# Patient Record
Sex: MALE | Race: WHITE | NOT HISPANIC OR LATINO | Employment: UNEMPLOYED | URBAN - METROPOLITAN AREA
[De-identification: names, ages, dates, MRNs, and addresses within clinical notes are randomized per-mention and may not be internally consistent; named-entity substitution may affect disease eponyms.]

---

## 2019-03-18 ENCOUNTER — OFFICE VISIT (OUTPATIENT)
Dept: FAMILY MEDICINE CLINIC | Facility: CLINIC | Age: 49
End: 2019-03-18
Payer: COMMERCIAL

## 2019-03-18 VITALS
RESPIRATION RATE: 16 BRPM | TEMPERATURE: 99.4 F | DIASTOLIC BLOOD PRESSURE: 90 MMHG | HEART RATE: 64 BPM | WEIGHT: 156 LBS | OXYGEN SATURATION: 98 % | SYSTOLIC BLOOD PRESSURE: 138 MMHG

## 2019-03-18 DIAGNOSIS — M25.512 LEFT ANTERIOR SHOULDER PAIN: Primary | ICD-10-CM

## 2019-03-18 PROCEDURE — 99202 OFFICE O/P NEW SF 15 MIN: CPT | Performed by: NURSE PRACTITIONER

## 2019-03-18 RX ORDER — PREDNISONE 20 MG/1
20 TABLET ORAL 2 TIMES DAILY WITH MEALS
Qty: 10 TABLET | Refills: 0 | Status: SHIPPED | OUTPATIENT
Start: 2019-03-18 | End: 2019-03-23

## 2019-03-18 RX ORDER — NAPROXEN 500 MG/1
500 TABLET ORAL 2 TIMES DAILY WITH MEALS
Qty: 60 TABLET | Refills: 0 | Status: SHIPPED | OUTPATIENT
Start: 2019-03-18

## 2019-03-18 NOTE — PROGRESS NOTES
[address] [facility]        NAME: Erich Merchant is a 51 y o  male  : 1970    MRN: [id number]  DATE: 2019  TIME: 6:38 PM    Assessment and Plan   Left anterior shoulder pain [M25 512]  1  Left anterior shoulder pain  XR shoulder 2+ vw left    predniSONE 20 mg tablet    naproxen (NAPROSYN) 500 mg tablet         Patient Instructions     Patient Instructions   Get xray of left shoulder as ordered  Take Prednisone and Naproxen as directed  Consult ortho if pain continues or if xray is abnormal  Call or return for problems/concerns          Chief Complaint     Chief Complaint   Patient presents with    Shoulder Pain     C/O left shoulder pain x's 1 month  C/O decreased range of motion  Patient states he heard a "pop" in the area about a month ago  Using OTC NSAIDS and rubs without relief  History of Present Illness       Left shoulder pain x 1 month- diagnosed with a "joint disease" when I was a kid- told he needs a left hip replacement  No known recent injury  Review of Systems   Review of Systems   Constitutional: Negative for activity change, diaphoresis, fatigue and fever  HENT: Negative for congestion, facial swelling, hearing loss, rhinorrhea, sinus pressure, sinus pain, sneezing, sore throat and voice change  Eyes: Negative for discharge and visual disturbance  Respiratory: Negative for cough, choking, chest tightness, shortness of breath, wheezing and stridor  Cardiovascular: Negative for chest pain, palpitations and leg swelling  Gastrointestinal: Negative for abdominal distention, abdominal pain, constipation, diarrhea, nausea and vomiting  Endocrine: Negative for polydipsia, polyphagia and polyuria  Genitourinary: Negative for difficulty urinating, dysuria, frequency and urgency  Musculoskeletal: Positive for arthralgias  Negative for back pain, gait problem, joint swelling, myalgias, neck pain and neck stiffness     Skin: Negative for color change, rash and wound  Neurological: Negative for dizziness, syncope, speech difficulty, weakness, light-headedness and headaches  Hematological: Negative for adenopathy  Does not bruise/bleed easily  Psychiatric/Behavioral: Negative for agitation, behavioral problems, confusion, hallucinations, sleep disturbance and suicidal ideas  The patient is not nervous/anxious  Current Medications       Current Outpatient Medications:     naproxen (NAPROSYN) 500 mg tablet, Take 1 tablet (500 mg total) by mouth 2 (two) times a day with meals, Disp: 60 tablet, Rfl: 0    predniSONE 20 mg tablet, Take 1 tablet (20 mg total) by mouth 2 (two) times a day with meals for 5 days, Disp: 10 tablet, Rfl: 0    Current Allergies     Allergies as of 03/18/2019 - Reviewed 03/18/2019   Allergen Reaction Noted    Asa [aspirin] Shortness Of Breath 03/18/2019    Penicillins  03/18/2019    Tegretol [carbamazepine] Dizziness and Confusion 03/18/2019    Tramadol Vomiting 03/18/2019            The following portions of the patient's history were reviewed and updated as appropriate: allergies, current medications, past family history, past medical history, past social history, past surgical history and problem list      Past Medical History:   Diagnosis Date    Hypercholesteremia     Myocardial infarction [facility])        Past Surgical History:   Procedure Laterality Date    MANDIBLE SURGERY         Family History   Problem Relation Age of Onset    Lung cancer Mother     Heart attack Father          Medications have been verified  Objective   /90   Pulse 64   Temp 99 4 °F (37 4 °C) (Tympanic)   Resp 16   Wt 70 8 kg (156 lb)   SpO2 98%        Physical Exam     Physical Exam   Constitutional: He is oriented to person, place, and time  He appears well-developed and well-nourished  No distress  Cardiovascular: Normal rate, regular rhythm and normal heart sounds  No murmur heard    Pulmonary/Chest: Effort normal and breath sounds normal  No respiratory distress  He has no wheezes  Musculoskeletal:        Left shoulder: He exhibits decreased range of motion (unable to lift LUE above head- can lift to 90 degrees), tenderness and pain  He exhibits no bony tenderness, no swelling, no crepitus, no deformity and no laceration  Neurological: He is alert and oriented to person, place, and time  Skin: Skin is warm and dry  He is not diaphoretic  Psychiatric: He has a normal mood and affect  His behavior is normal  Judgment and thought content normal    Nursing note and vitals reviewed

## 2019-03-18 NOTE — PATIENT INSTRUCTIONS
Get xray of left shoulder as ordered  Take Prednisone and Naproxen as directed  Consult ortho if pain continues or if xray is abnormal  Call or return for problems/concerns

## 2019-03-19 ENCOUNTER — APPOINTMENT (OUTPATIENT)
Dept: RADIOLOGY | Facility: CLINIC | Age: 49
End: 2019-03-19
Payer: COMMERCIAL

## 2019-03-19 DIAGNOSIS — M25.512 LEFT ANTERIOR SHOULDER PAIN: ICD-10-CM

## 2019-03-19 PROCEDURE — 73030 X-RAY EXAM OF SHOULDER: CPT

## 2019-04-15 ENCOUNTER — OFFICE VISIT (OUTPATIENT)
Dept: URGENT CARE | Facility: CLINIC | Age: 49
End: 2019-04-15
Payer: COMMERCIAL

## 2019-04-15 VITALS
RESPIRATION RATE: 18 BRPM | TEMPERATURE: 98.1 F | HEART RATE: 63 BPM | DIASTOLIC BLOOD PRESSURE: 75 MMHG | OXYGEN SATURATION: 97 % | SYSTOLIC BLOOD PRESSURE: 125 MMHG

## 2019-04-15 DIAGNOSIS — H65.113 ACUTE MUCOID OTITIS MEDIA OF BOTH EARS: Primary | ICD-10-CM

## 2019-04-15 PROCEDURE — 99283 EMERGENCY DEPT VISIT LOW MDM: CPT | Performed by: NURSE PRACTITIONER

## 2019-04-15 PROCEDURE — G0382 LEV 3 HOSP TYPE B ED VISIT: HCPCS | Performed by: NURSE PRACTITIONER

## 2019-04-15 PROCEDURE — 99203 OFFICE O/P NEW LOW 30 MIN: CPT | Performed by: NURSE PRACTITIONER

## 2019-04-15 RX ORDER — AZITHROMYCIN 250 MG/1
TABLET, FILM COATED ORAL
Qty: 6 TABLET | Refills: 0 | Status: SHIPPED | OUTPATIENT
Start: 2019-04-15 | End: 2019-04-19

## 2019-09-22 ENCOUNTER — APPOINTMENT (EMERGENCY)
Dept: CT IMAGING | Facility: HOSPITAL | Age: 49
End: 2019-09-22
Payer: COMMERCIAL

## 2019-09-22 ENCOUNTER — HOSPITAL ENCOUNTER (EMERGENCY)
Facility: HOSPITAL | Age: 49
Discharge: HOME | End: 2019-09-22
Admitting: EMERGENCY MEDICINE
Payer: COMMERCIAL

## 2019-09-22 VITALS
DIASTOLIC BLOOD PRESSURE: 72 MMHG | WEIGHT: 158 LBS | RESPIRATION RATE: 18 BRPM | TEMPERATURE: 97.7 F | OXYGEN SATURATION: 96 % | BODY MASS INDEX: 23.4 KG/M2 | HEIGHT: 69 IN | SYSTOLIC BLOOD PRESSURE: 133 MMHG | HEART RATE: 84 BPM

## 2019-09-22 DIAGNOSIS — N30.90 CYSTITIS: ICD-10-CM

## 2019-09-22 DIAGNOSIS — R19.7 DIARRHEA: ICD-10-CM

## 2019-09-22 DIAGNOSIS — R10.84 GENERALIZED ABDOMINAL PAIN: ICD-10-CM

## 2019-09-22 DIAGNOSIS — K52.9 COLITIS: Primary | ICD-10-CM

## 2019-09-22 DIAGNOSIS — K52.9 ENTERITIS: ICD-10-CM

## 2019-09-22 DIAGNOSIS — R11.10 VOMITING: ICD-10-CM

## 2019-09-22 LAB
ALBUMIN SERPL BCP-MCNC: 5.1 G/DL (ref 3.5–5.7)
ALP SERPL-CCNC: 53 U/L (ref 40–150)
ALT SERPL W P-5'-P-CCNC: 10 U/L (ref 7–52)
ANION GAP SERPL CALCULATED.3IONS-SCNC: 13 MMOL/L (ref 4–13)
APTT PPP: 32 SECONDS (ref 23–37)
AST SERPL W P-5'-P-CCNC: 15 U/L (ref 13–39)
BACTERIA UR QL AUTO: ABNORMAL /HPF
BASOPHILS # BLD AUTO: 0.1 THOUSANDS/ΜL (ref 0–0.1)
BASOPHILS NFR BLD AUTO: 1 % (ref 0–2)
BILIRUB SERPL-MCNC: 0.6 MG/DL (ref 0.2–1)
BILIRUB UR QL STRIP: NEGATIVE
BUN SERPL-MCNC: 20 MG/DL (ref 7–25)
CALCIUM SERPL-MCNC: 10.3 MG/DL (ref 8.6–10.5)
CHLORIDE SERPL-SCNC: 101 MMOL/L (ref 98–107)
CLARITY UR: CLEAR
CO2 SERPL-SCNC: 23 MMOL/L (ref 21–31)
COLOR UR: YELLOW
CREAT SERPL-MCNC: 1.16 MG/DL (ref 0.7–1.3)
EOSINOPHIL # BLD AUTO: 0 THOUSAND/ΜL (ref 0–0.61)
EOSINOPHIL NFR BLD AUTO: 0 % (ref 0–5)
ERYTHROCYTE [DISTWIDTH] IN BLOOD BY AUTOMATED COUNT: 13.1 % (ref 11.5–14.5)
GFR SERPL CREATININE-BSD FRML MDRD: 74 ML/MIN/1.73SQ M
GLUCOSE SERPL-MCNC: 181 MG/DL (ref 65–99)
GLUCOSE UR STRIP-MCNC: NEGATIVE MG/DL
HCT VFR BLD AUTO: 44.5 % (ref 42–47)
HGB BLD-MCNC: 15.1 G/DL (ref 14–18)
HGB UR QL STRIP.AUTO: NEGATIVE
INR PPP: 1.07 (ref 0.9–1.5)
KETONES UR STRIP-MCNC: ABNORMAL MG/DL
LEUKOCYTE ESTERASE UR QL STRIP: NEGATIVE
LIPASE SERPL-CCNC: <10 U/L (ref 11–82)
LYMPHOCYTES # BLD AUTO: 1.4 THOUSANDS/ΜL (ref 0.6–4.47)
LYMPHOCYTES NFR BLD AUTO: 17 % (ref 21–51)
MCH RBC QN AUTO: 31 PG (ref 26–34)
MCHC RBC AUTO-ENTMCNC: 33.8 G/DL (ref 31–37)
MCV RBC AUTO: 92 FL (ref 81–99)
MONOCYTES # BLD AUTO: 0.3 THOUSAND/ΜL (ref 0.17–1.22)
MONOCYTES NFR BLD AUTO: 4 % (ref 2–12)
NEUTROPHILS # BLD AUTO: 6.5 THOUSANDS/ΜL (ref 1.4–6.5)
NEUTS SEG NFR BLD AUTO: 79 % (ref 42–75)
NITRITE UR QL STRIP: NEGATIVE
NON-SQ EPI CELLS URNS QL MICRO: ABNORMAL /HPF
PH UR STRIP.AUTO: >=9 [PH]
PLATELET # BLD AUTO: 222 THOUSANDS/UL (ref 149–390)
PMV BLD AUTO: 7.4 FL (ref 8.6–11.7)
POTASSIUM SERPL-SCNC: 3.5 MMOL/L (ref 3.5–5.5)
PROT SERPL-MCNC: 8.3 G/DL (ref 6.4–8.9)
PROT UR STRIP-MCNC: ABNORMAL MG/DL
PROTHROMBIN TIME: 12.4 SECONDS (ref 10.2–13)
RBC # BLD AUTO: 4.86 MILLION/UL (ref 4.3–5.9)
RBC #/AREA URNS AUTO: ABNORMAL /HPF
SODIUM SERPL-SCNC: 137 MMOL/L (ref 134–143)
SP GR UR STRIP.AUTO: 1.01 (ref 1–1.03)
UROBILINOGEN UR QL STRIP.AUTO: 0.2 E.U./DL
WBC # BLD AUTO: 8.3 THOUSAND/UL (ref 4.8–10.8)
WBC #/AREA URNS AUTO: ABNORMAL /HPF

## 2019-09-22 PROCEDURE — 36415 COLL VENOUS BLD VENIPUNCTURE: CPT

## 2019-09-22 PROCEDURE — 85610 PROTHROMBIN TIME: CPT

## 2019-09-22 PROCEDURE — 81001 URINALYSIS AUTO W/SCOPE: CPT

## 2019-09-22 PROCEDURE — 85025 COMPLETE CBC W/AUTO DIFF WBC: CPT

## 2019-09-22 PROCEDURE — 96361 HYDRATE IV INFUSION ADD-ON: CPT

## 2019-09-22 PROCEDURE — 85730 THROMBOPLASTIN TIME PARTIAL: CPT

## 2019-09-22 PROCEDURE — 80053 COMPREHEN METABOLIC PANEL: CPT

## 2019-09-22 PROCEDURE — 96375 TX/PRO/DX INJ NEW DRUG ADDON: CPT

## 2019-09-22 PROCEDURE — 83690 ASSAY OF LIPASE: CPT

## 2019-09-22 PROCEDURE — 96374 THER/PROPH/DIAG INJ IV PUSH: CPT

## 2019-09-22 PROCEDURE — 74177 CT ABD & PELVIS W/CONTRAST: CPT

## 2019-09-22 PROCEDURE — 81003 URINALYSIS AUTO W/O SCOPE: CPT

## 2019-09-22 PROCEDURE — 99284 EMERGENCY DEPT VISIT MOD MDM: CPT

## 2019-09-22 RX ORDER — CIPROFLOXACIN 500 MG/1
500 TABLET, FILM COATED ORAL ONCE
Status: COMPLETED | OUTPATIENT
Start: 2019-09-22 | End: 2019-09-22

## 2019-09-22 RX ORDER — METRONIDAZOLE 500 MG/1
500 TABLET ORAL ONCE
Status: COMPLETED | OUTPATIENT
Start: 2019-09-22 | End: 2019-09-22

## 2019-09-22 RX ORDER — METRONIDAZOLE 500 MG/1
500 TABLET ORAL EVERY 8 HOURS SCHEDULED
Qty: 21 TABLET | Refills: 0 | Status: SHIPPED | OUTPATIENT
Start: 2019-09-22 | End: 2019-09-29

## 2019-09-22 RX ORDER — ONDANSETRON 2 MG/ML
4 INJECTION INTRAMUSCULAR; INTRAVENOUS ONCE
Status: COMPLETED | OUTPATIENT
Start: 2019-09-22 | End: 2019-09-22

## 2019-09-22 RX ORDER — FENTANYL CITRATE 50 UG/ML
50 INJECTION, SOLUTION INTRAMUSCULAR; INTRAVENOUS ONCE
Status: COMPLETED | OUTPATIENT
Start: 2019-09-22 | End: 2019-09-22

## 2019-09-22 RX ORDER — CIPROFLOXACIN 500 MG/1
500 TABLET, FILM COATED ORAL 2 TIMES DAILY
Qty: 14 TABLET | Refills: 0 | Status: SHIPPED | OUTPATIENT
Start: 2019-09-22 | End: 2019-09-29

## 2019-09-22 RX ORDER — OXYCODONE HCL 10 MG/1
10 TABLET, FILM COATED, EXTENDED RELEASE ORAL EVERY 6 HOURS
COMMUNITY

## 2019-09-22 RX ORDER — METOCLOPRAMIDE HYDROCHLORIDE 5 MG/ML
10 INJECTION INTRAMUSCULAR; INTRAVENOUS ONCE
Status: COMPLETED | OUTPATIENT
Start: 2019-09-22 | End: 2019-09-22

## 2019-09-22 RX ADMIN — CIPROFLOXACIN HYDROCHLORIDE 500 MG: 500 TABLET, FILM COATED ORAL at 19:23

## 2019-09-22 RX ADMIN — METRONIDAZOLE 500 MG: 500 TABLET, FILM COATED ORAL at 19:23

## 2019-09-22 RX ADMIN — IOHEXOL 100 ML: 350 INJECTION, SOLUTION INTRAVENOUS at 18:05

## 2019-09-22 RX ADMIN — IOHEXOL 50 ML: 240 INJECTION, SOLUTION INTRATHECAL; INTRAVASCULAR; INTRAVENOUS; ORAL at 15:15

## 2019-09-22 RX ADMIN — SODIUM CHLORIDE 2000 ML: 0.9 INJECTION, SOLUTION INTRAVENOUS at 14:53

## 2019-09-22 RX ADMIN — FENTANYL CITRATE 50 MCG: 50 INJECTION INTRAMUSCULAR; INTRAVENOUS at 14:54

## 2019-09-22 RX ADMIN — ONDANSETRON 4 MG: 2 INJECTION INTRAMUSCULAR; INTRAVENOUS at 14:54

## 2019-09-22 RX ADMIN — METOCLOPRAMIDE 10 MG: 5 INJECTION, SOLUTION INTRAMUSCULAR; INTRAVENOUS at 18:56

## 2019-09-22 NOTE — ED CARE HANDOFF
Emergency Department Sign Out Note        Sign out and transfer of care from Dr Tessie Dalton  See Separate Emergency Department note  The patient, Erickson Ribera, was evaluated by the previous provider for abdominal pain  Workup Completed:  Labs Reviewed   CBC AND DIFFERENTIAL - Abnormal       Result Value Ref Range Status    WBC 8 30  4 80 - 10 80 Thousand/uL Final    RBC 4 86  4 30 - 5 90 Million/uL Final    Hemoglobin 15 1  14 0 - 18 0 g/dL Final    Hematocrit 44 5  42 0 - 47 0 % Final    MCV 92  81 - 99 fL Final    MCH 31 0  26 0 - 34 0 pg Final    MCHC 33 8  31 0 - 37 0 g/dL Final    RDW 13 1  11 5 - 14 5 % Final    MPV 7 4 (*) 8 6 - 11 7 fL Final    Platelets 145  522 - 390 Thousands/uL Final    Neutrophils Relative 79 (*) 42 - 75 % Final    Lymphocytes Relative 17 (*) 21 - 51 % Final    Monocytes Relative 4  2 - 12 % Final    Eosinophils Relative 0  0 - 5 % Final    Basophils Relative 1  0 - 2 % Final    Neutrophils Absolute 6 50  1 40 - 6 50 Thousands/µL Final    Lymphocytes Absolute 1 40  0 60 - 4 47 Thousands/µL Final    Monocytes Absolute 0 30  0 17 - 1 22 Thousand/µL Final    Eosinophils Absolute 0 00  0 00 - 0 61 Thousand/µL Final    Basophils Absolute 0 10  0 00 - 0 10 Thousands/µL Final   COMPREHENSIVE METABOLIC PANEL - Abnormal    Sodium 137  134 - 143 mmol/L Final    Potassium 3 5  3 5 - 5 5 mmol/L Final    Chloride 101  98 - 107 mmol/L Final    CO2 23  21 - 31 mmol/L Final    ANION GAP 13  4 - 13 mmol/L Final    BUN 20  7 - 25 mg/dL Final    Creatinine 1 16  0 70 - 1 30 mg/dL Final    Comment: Standardized to IDMS reference method    Glucose 181 (*) 65 - 99 mg/dL Final    Comment:   If the patient is fasting, the ADA then defines impaired fasting glucose as > 100 mg/dL and diabetes as > or equal to 123 mg/dL  Specimen collection should occur prior to Sulfasalazine administration due to the potential for falsely depressed results   Specimen collection should occur prior to Sulfapyridine administration due to the potential for falsely elevated results  Calcium 10 3  8 6 - 10 5 mg/dL Final    AST 15  13 - 39 U/L Final    Comment:   Specimen collection should occur prior to Sulfasalazine administration due to the potential for falsely depressed results  ALT 10  7 - 52 U/L Final    Comment:   Specimen collection should occur prior to Sulfasalazine administration due to the potential for falsely depressed results       Alkaline Phosphatase 53  40 - 150 U/L Final    Total Protein 8 3  6 4 - 8 9 g/dL Final    Albumin 5 1  3 5 - 5 7 g/dL Final    Total Bilirubin 0 60  0 20 - 1 00 mg/dL Final    eGFR 74  ml/min/1 73sq m Final    Narrative:     [place] guidelines for Chronic Kidney Disease (CKD):     Stage 1 with normal or high GFR (GFR > 90 mL/min/1 73 square meters)    Stage 2 Mild CKD (GFR = 60-89 mL/min/1 73 square meters)    Stage 3A Moderate CKD (GFR = 45-59 mL/min/1 73 square meters)    Stage 3B Moderate CKD (GFR = 30-44 mL/min/1 73 square meters)    Stage 4 Severe CKD (GFR = 15-29 mL/min/1 73 square meters)    Stage 5 End Stage CKD (GFR <15 mL/min/1 73 square meters)  Note: GFR calculation is accurate only with a steady state creatinine   LIPASE - Abnormal    Lipase <10 (*) 11 - 82 u/L Final   URINALYSIS WITH REFLEX TO MICROSCOPIC - Abnormal    Color, UA Yellow  Yellow, Straw Final    Clarity, UA Clear  Hazy, Clear Final    Specific Gravity, UA 1 010  >1 005-<1 030 Final    pH, UA >=9 0 (*) 5 0, 5 5, 6 0, 6 5, 7 0, 7 5 Final    Leukocytes, UA Negative  Negative Final    Nitrite, UA Negative  Negative Final    Protein, UA 2+ (*) Negative, Interference- unable to analyze mg/dl Final    Glucose, UA Negative  Negative mg/dl Final    Ketones, UA 40 (2+) (*) Negative mg/dl Final    Urobilinogen, UA 0 2  0 2, 1 0 E U /dl E U /dl Final    Bilirubin, UA Negative  Negative Final    Blood, UA Negative  Negative Final   URINE MICROSCOPIC - Abnormal    RBC, UA 2-4 (*) None Seen, 0-5 /hpf Final    WBC, UA 1-2 (*) None Seen, 0-5, 5-55, 5-65 /hpf Final    Epithelial Cells Occasional  None Seen, Occasional /hpf Final    Bacteria, UA Occasional  None Seen, Occasional /hpf Final   PROTIME-INR - Normal    Protime 12 4  10 2 - 13 0 seconds Final    Comment:      INR 1 07  0 90 - 1 50 Final    Comment:     APTT - Normal    PTT 32  23 - 37 seconds Final    Comment: Therapeutic Heparin Range =  60-90 seconds     Ct Abdomen Pelvis With Contrast    Result Date: 9/22/2019  Narrative: CT ABDOMEN AND PELVIS WITH IV CONTRAST INDICATION:   Vomiting and diarrhea with abdominal pain  COMPARISON:  None  TECHNIQUE:  CT examination of the abdomen and pelvis was performed  Axial, sagittal, and coronal 2D reformatted images were created from the source data and submitted for interpretation  Radiation dose length product (DLP) for this visit:  277 5 mGy-cm   This examination, like all CT scans performed in the [facility], was performed utilizing techniques to minimize radiation dose exposure, including the use of iterative reconstruction and automated exposure control  IV Contrast:  50 mL of iohexol (OMNIPAQUE) 100 mL of iohexol (OMNIPAQUE) Enteric Contrast:  Enteric contrast was administered  FINDINGS: ABDOMEN LOWER CHEST:  Atelectatic changes are noted at the lung bases  LIVER/BILIARY TREE:  Unremarkable  GALLBLADDER:  No calcified gallstones  No pericholecystic inflammatory change  SPLEEN:  Unremarkable  PANCREAS:  Unremarkable  ADRENAL GLANDS:  Unremarkable  KIDNEYS/URETERS:  Unremarkable  No hydronephrosis  STOMACH AND BOWEL:  Diffuse thickening of the colonic wall with significant surrounding inflammatory changes is seen  Thickening of scattered loops of small bowel are visualized  No evidence of bowel obstruction  Colonic diverticulosis without evidence of acute diverticulitis  APPENDIX:  No findings to suggest appendicitis   ABDOMINOPELVIC CAVITY:  No ascites or free intraperitoneal air  No lymphadenopathy  VESSELS:  Unremarkable for patient's age  PELVIS  Limited evaluation of the pelvis due to streak artifact from patient's left hip arthroplasty REPRODUCTIVE ORGANS:  Unremarkable for patient's age  URINARY BLADDER:  Thickening of the urinary bladder wall  ABDOMINAL WALL/INGUINAL REGIONS:  Unremarkable  OSSEOUS STRUCTURES:  Questionable AVN of the right femoral head is noted  Impression: Diffuse thickening of the colonic wall with surrounding inflammatory changes  Findings may represent pancolitis  Clinical correlation is recommended  Follow-up with gastroenterology is recommended  Thickening of scattered loops of small bowel which may represent enteritis  Thickening of the urinary bladder wall which may represent cystitis  Workstation performed: [id number]         ED Course / Workup Pending (followup):  200 - case discussed and care assumed from Dr Jill Best pending CT scan results and further evaluation and treatment and disposition  CT scan resulted showing colitis enteritis and cystitis consistent with patient's history and examination patient seen and evaluated in the ED he is nontoxic well-appearing clinically and hemodynamically stable blood work reviewed unremarkable advised of findings on CT scan and recommended Cipro and Flagyl for colitis for now and also covering possible cystitis and advised prompt follow-up with PCP and Gastroenterology via referral provided for further evaluation and treatment and obtain test results return precautions and anticipatory guidance discussed                                 Procedures  MDM    Disposition  Final diagnoses:   Generalized abdominal pain   Vomiting   Diarrhea   Colitis   Enteritis   Cystitis     Time reflects when diagnosis was documented in both MDM as applicable and the Disposition within this note     Time User Action Codes Description Comment    9/22/2019  6:23 PM Jerilyn Hutton Add [R10 84] Generalized abdominal pain     9/22/2019  6:23 PM Zhanna Gill Add [R11 10] Vomiting     9/22/2019  6:23 PM Zhanna Emanuel Add [R19 7] Diarrhea     9/22/2019  7:00 PM Charley Richards Add [K52 9] Colitis     9/22/2019  7:00 PM Ryan Salgado Add [K52 9] Enteritis     9/22/2019  7:00 PM Charley Richards Modify [R10 84] Generalized abdominal pain     9/22/2019  7:00 PM Ruthine Olp [K52 9] Colitis     9/22/2019  7:00 PM Charley Richards Add [N30 90] Cystitis       ED Disposition     ED Disposition Condition Date/Time Comment    Discharge Stable Sun Sep 22, 2019  7:00 PM Jose Manuel Emanuel discharge to home/self care  Follow-up Information     Follow up With Specialties Details Why [address], [address] [facility], Nurse Practitioner, Emergency Medicine Schedule an appointment as soon as possible for a visit in 3 days  [address] [place] [address]      Missael Fox MD Gastroenterology Schedule an appointment as soon as possible for a visit in 2 days [facility] [address]  [facility]FFILIATED WITH [facility]          Patient's Medications   Discharge Prescriptions    CIPROFLOXACIN (CIPRO) 500 MG TABLET    Take 1 tablet (500 mg total) by mouth 2 (two) times a day for 7 days       Start Date: 9/22/2019 End Date: 9/29/2019       Order Dose: 500 mg       Quantity: 14 tablet    Refills: 0    METRONIDAZOLE (FLAGYL) 500 MG TABLET    Take 1 tablet (500 mg total) by mouth every 8 (eight) hours for 7 days       Start Date: 9/22/2019 End Date: 9/29/2019       Order Dose: 500 mg       Quantity: 21 tablet    Refills: 0     No discharge procedures on file         ED Provider  Electronically Signed by     James Lira DO  09/22/19 2424

## 2019-09-22 NOTE — ED PROVIDER NOTES
History  Chief Complaint   Patient presents with    Abdominal Pain    Vomiting    Diarrhea     Ronaldo Vasquez is a 58-year-old male who came to the emergency department accompanied by his spouse due to generalized abdominal pain accompanied by vomiting and diarrhea which started today  Patient denies fever or chills  Patient denies shortness of breath  He denies hematemesis, melena or hematochezia  History provided by:  Patient   used: No    Abdominal Pain   Pain location:  Generalized  Pain quality: aching    Pain radiates to:  Does not radiate  Pain severity:  Severe  Onset quality:  Sudden  Duration: Today  Timing:  Constant  Progression:  Unchanged  Chronicity:  New  Context: not alcohol use, not awakening from sleep, not diet changes, not eating, not laxative use, not medication withdrawal, not previous surgeries, not recent illness, not recent sexual activity, not recent travel, not retching, not sick contacts, not suspicious food intake and not trauma    Relieved by:  Nothing  Worsened by:  Nothing  Ineffective treatments:  None tried  Associated symptoms: diarrhea and vomiting    Associated symptoms: no anorexia, no belching, no chest pain, no chills, no constipation, no cough, no dysuria, no fatigue, no fever, no flatus, no hematemesis, no hematochezia, no hematuria, no melena, no nausea, no shortness of breath and no sore throat    Diarrhea:     Quality:  Unable to specify    Number of occurrences:  Unable to specify    Severity:  Moderate    Diarrhea duration: Today  Timing:  Constant    Progression:  Unchanged  Vomiting:     Quality:  Unable to specify    Number of occurrences:  Unable to specify    Severity:  Moderate    Vomiting duration: Today  Timing:  Constant    Progression:  Unchanged  Risk factors: has not had multiple surgeries and no recent hospitalization        Prior to Admission Medications   Prescriptions Last Dose Informant Patient Reported? Taking? naproxen (NAPROSYN) 500 mg tablet   No No   Sig: Take 1 tablet (500 mg total) by mouth 2 (two) times a day with meals      Facility-Administered Medications: None       Past Medical History:   Diagnosis Date    Hypercholesteremia     Myocardial infarction [facility])        Past Surgical History:   Procedure Laterality Date    MANDIBLE SURGERY         Family History   Problem Relation Age of Onset    Lung cancer Mother     Heart attack Father      I have reviewed and agree with the history as documented  Social History     Tobacco Use    Smoking status: Current Every Day Smoker     Packs/day: 0 50     Types: Cigarettes    Smokeless tobacco: Never Used   Substance Use Topics    Alcohol use: Never     Frequency: Never    Drug use: Never        Review of Systems   Constitutional: Negative for chills, fatigue and fever  HENT: Negative for sore throat  Eyes: Negative  Respiratory: Negative for cough and shortness of breath  Cardiovascular: Negative for chest pain  Gastrointestinal: Positive for abdominal pain, diarrhea and vomiting  Negative for anorexia, constipation, flatus, hematemesis, hematochezia, melena and nausea  Endocrine: Negative  Genitourinary: Negative for dysuria and hematuria  Musculoskeletal: Negative  Skin: Negative  Allergic/Immunologic: Negative  Neurological: Negative  Hematological: Negative  Psychiatric/Behavioral: Negative  Physical Exam  Physical Exam   Constitutional: He is oriented to person, place, and time  He appears well-developed and well-nourished  Non-toxic appearance  He does not appear ill  No distress  HENT:   Head: Normocephalic and atraumatic  Mouth/Throat: Oropharynx is clear and moist  No oropharyngeal exudate  Eyes: Pupils are equal, round, and reactive to light  EOM are normal  No scleral icterus  Cardiovascular: Normal rate, regular rhythm, normal heart sounds and intact distal pulses   Exam reveals no gallop and no friction rub  No murmur heard  Pulmonary/Chest: Effort normal and breath sounds normal  No stridor  No respiratory distress  He has no wheezes  He has no rales  Abdominal: Soft  Normal appearance and bowel sounds are normal  There is generalized tenderness  Neurological: He is alert and oriented to person, place, and time  Skin: Skin is warm and dry  No rash noted  He is not diaphoretic  No cyanosis or erythema  There is pallor  Psychiatric: He has a normal mood and affect  His behavior is normal    Nursing note and vitals reviewed        Vital Signs  ED Triage Vitals   Temperature Pulse Respirations Blood Pressure SpO2   09/22/19 1359 09/22/19 1359 09/22/19 1359 09/22/19 1359 09/22/19 1359   97 7 °F (36 5 °C) 82 20 145/69 99 %      Temp Source Heart Rate Source Patient Position - Orthostatic VS BP Location FiO2 (%)   09/22/19 1359 09/22/19 1359 09/22/19 1359 09/22/19 1359 --   Temporal Monitor Sitting Left arm       Pain Score       09/22/19 1454       9           Vitals:    09/22/19 1359   BP: 145/69   Pulse: 82   Patient Position - Orthostatic VS: Sitting         Visual Acuity      ED Medications  Medications   metoclopramide (REGLAN) injection 10 mg (has no administration in time range)   sodium chloride 0 9 % bolus 2,000 mL (0 mL Intravenous Stopped 9/22/19 1553)   ondansetron (ZOFRAN) injection 4 mg (4 mg Intravenous Given 9/22/19 1454)   fentanyl citrate (PF) 100 MCG/2ML 50 mcg (50 mcg Intravenous Given 9/22/19 1454)   iohexol (OMNIPAQUE) 350 MG/ML injection (MULTI-DOSE) 100 mL (100 mL Intravenous Given 9/22/19 1805)   iohexol (OMNIPAQUE) 240 MG/ML solution 50 mL (50 mL Oral Given 9/22/19 1515)       Diagnostic Studies  Results Reviewed     Procedure Component Value Units Date/Time    Comprehensive metabolic panel [[id number]]  (Abnormal) Collected:  09/22/19 1452    Lab Status:  Final result Specimen:  Blood from Arm, Left Updated:  09/22/19 1514     Sodium 137 mmol/L      Potassium 3 5 mmol/L Chloride 101 mmol/L      CO2 23 mmol/L      ANION GAP 13 mmol/L      BUN 20 mg/dL      Creatinine 1 16 mg/dL      Glucose 181 mg/dL      Calcium 10 3 mg/dL      AST 15 U/L      ALT 10 U/L      Alkaline Phosphatase 53 U/L      Total Protein 8 3 g/dL      Albumin 5 1 g/dL      Total Bilirubin 0 60 mg/dL      eGFR 74 ml/min/1 73sq m     Narrative:       [place] guidelines for Chronic Kidney Disease (CKD):     Stage 1 with normal or high GFR (GFR > 90 mL/min/1 73 square meters)    Stage 2 Mild CKD (GFR = 60-89 mL/min/1 73 square meters)    Stage 3A Moderate CKD (GFR = 45-59 mL/min/1 73 square meters)    Stage 3B Moderate CKD (GFR = 30-44 mL/min/1 73 square meters)    Stage 4 Severe CKD (GFR = 15-29 mL/min/1 73 square meters)    Stage 5 End Stage CKD (GFR <15 mL/min/1 73 square meters)  Note: GFR calculation is accurate only with a steady state creatinine    Lipase [[id number]]  (Abnormal) Collected:  09/22/19 1452    Lab Status:  Final result Specimen:  Blood from Arm, Left Updated:  09/22/19 1514     Lipase <10 u/L     APTT [[id number]]  (Normal) Collected:  09/22/19 1452    Lab Status:  Final result Specimen:  Blood from Arm, Right Updated:  09/22/19 1508     PTT 32 seconds     Protime-INR [[id number]]  (Normal) Collected:  09/22/19 1452    Lab Status:  Final result Specimen:  Blood from Arm, Right Updated:  09/22/19 1508     Protime 12 4 seconds      INR 1 07    Urine Microscopic [[id number]]  (Abnormal) Collected:  09/22/19 1447    Lab Status:  Final result Specimen:  Urine, Clean Catch Updated:  09/22/19 1507     RBC, UA 2-4 /hpf      WBC, UA 1-2 /hpf      Epithelial Cells Occasional /hpf      Bacteria, UA Occasional /hpf     CBC and differential [[id number]]  (Abnormal) Collected:  09/22/19 1452    Lab Status:  Final result Specimen:  Blood from Arm, Right Updated:  09/22/19 1459     WBC 8 30 Thousand/uL      RBC 4 86 Million/uL      Hemoglobin 15 1 g/dL      Hematocrit 44 5 %      MCV 92 fL      MCH 31 0 pg      MCHC 33 8 g/dL      RDW 13 1 %      MPV 7 4 fL      Platelets 121 Thousands/uL      Neutrophils Relative 79 %      Lymphocytes Relative 17 %      Monocytes Relative 4 %      Eosinophils Relative 0 %      Basophils Relative 1 %      Neutrophils Absolute 6 50 Thousands/µL      Lymphocytes Absolute 1 40 Thousands/µL      Monocytes Absolute 0 30 Thousand/µL      Eosinophils Absolute 0 00 Thousand/µL      Basophils Absolute 0 10 Thousands/µL     UA w Reflex to Microscopic [[id number]]  (Abnormal) Collected:  09/22/19 1447    Lab Status:  Final result Specimen:  Urine, Clean Catch Updated:  09/22/19 1454     Color, UA Yellow     Clarity, UA Clear     Specific Gravity, UA 1 010     pH, UA >=9 0     Leukocytes, UA Negative     Nitrite, UA Negative     Protein, UA 2+ mg/dl      Glucose, UA Negative mg/dl      Ketones, UA 40 (2+) mg/dl      Urobilinogen, UA 0 2 E U /dl      Bilirubin, UA Negative     Blood, UA Negative                 CT abdomen pelvis with contrast    (Results Pending)              Procedures  Procedures       ED Course  ED Course as of Sep 22 1824   Sun Sep 22, 2019   [facility] ED care was transferred to Dr Eden Brar  Patient is resting comfortably on the stretcher with his wife at the bedside                                      MDM  Number of Diagnoses or Management Options  Diarrhea: new and requires workup  Generalized abdominal pain: new and requires workup  Vomiting: new and requires workup     Amount and/or Complexity of Data Reviewed  Clinical lab tests: ordered and reviewed  Tests in the radiology section of CPT®: ordered  Tests in the medicine section of CPT®: ordered and reviewed  Decide to obtain previous medical records or to obtain history from someone other than the patient: yes  Obtain history from someone other than the patient: yes  Review and summarize past medical records: yes  Independent visualization of images, tracings, or specimens: yes    Risk of Complications, Morbidity, and/or Mortality  Presenting problems: moderate  Diagnostic procedures: moderate  Management options: moderate    Patient Progress  Patient progress: stable      Disposition  Final diagnoses:   Generalized abdominal pain   Vomiting   Diarrhea     Time reflects when diagnosis was documented in both MDM as applicable and the Disposition within this note     Time User Action Codes Description Comment    9/22/2019  6:23 PM Natalie Camarena Add [R10 84] Generalized abdominal pain     9/22/2019  6:23 PM Natalie Camarena Add [R11 10] Vomiting     9/22/2019  6:23 PM JAIMEE Segundo 64 [R19 7] Diarrhea       ED Disposition     None      Follow-up Information    None         Patient's Medications   Discharge Prescriptions    No medications on file     No discharge procedures on file      ED Provider  Electronically Signed by           Luis F Johnson MD  09/22/19 1738

## 2019-10-25 NOTE — PRE-PROCEDURE INSTRUCTIONS
Pre-Surgery Instructions:   Medication Instructions    oxyCODONE (OxyCONTIN) 10 mg 12 hr tablet Instructed patient per Anesthesia Guidelines  LD 10/29

## 2019-10-29 ENCOUNTER — ANESTHESIA EVENT (OUTPATIENT)
Dept: GASTROENTEROLOGY | Facility: HOSPITAL | Age: 49
End: 2019-10-29

## 2019-10-30 ENCOUNTER — ANESTHESIA (OUTPATIENT)
Dept: GASTROENTEROLOGY | Facility: HOSPITAL | Age: 49
End: 2019-10-30

## 2019-10-30 ENCOUNTER — HOSPITAL ENCOUNTER (OUTPATIENT)
Dept: GASTROENTEROLOGY | Facility: HOSPITAL | Age: 49
Setting detail: OUTPATIENT SURGERY
Discharge: HOME | End: 2019-10-30
Attending: INTERNAL MEDICINE | Admitting: INTERNAL MEDICINE
Payer: COMMERCIAL

## 2019-10-30 VITALS
BODY MASS INDEX: 23.4 KG/M2 | DIASTOLIC BLOOD PRESSURE: 69 MMHG | SYSTOLIC BLOOD PRESSURE: 157 MMHG | OXYGEN SATURATION: 97 % | HEART RATE: 74 BPM | TEMPERATURE: 97.2 F | WEIGHT: 158 LBS | RESPIRATION RATE: 18 BRPM | HEIGHT: 69 IN

## 2019-10-30 DIAGNOSIS — R19.7 DIARRHEA, UNSPECIFIED: ICD-10-CM

## 2019-10-30 DIAGNOSIS — R93.3 ABNORMAL FINDINGS ON DIAGNOSTIC IMAGING OF OTHER PARTS OF DIGESTIVE TRACT: ICD-10-CM

## 2019-10-30 PROCEDURE — 88305 TISSUE EXAM BY PATHOLOGIST: CPT | Performed by: PATHOLOGY

## 2019-10-30 RX ORDER — LIDOCAINE HYDROCHLORIDE 10 MG/ML
INJECTION, SOLUTION INFILTRATION; PERINEURAL AS NEEDED
Status: DISCONTINUED | OUTPATIENT
Start: 2019-10-30 | End: 2019-10-30 | Stop reason: SURG

## 2019-10-30 RX ORDER — PROPOFOL 10 MG/ML
INJECTION, EMULSION INTRAVENOUS AS NEEDED
Status: DISCONTINUED | OUTPATIENT
Start: 2019-10-30 | End: 2019-10-30 | Stop reason: SURG

## 2019-10-30 RX ORDER — SODIUM CHLORIDE, SODIUM LACTATE, POTASSIUM CHLORIDE, CALCIUM CHLORIDE 600; 310; 30; 20 MG/100ML; MG/100ML; MG/100ML; MG/100ML
125 INJECTION, SOLUTION INTRAVENOUS CONTINUOUS
Status: DISCONTINUED | OUTPATIENT
Start: 2019-10-30 | End: 2019-11-03 | Stop reason: HOSPADM

## 2019-10-30 RX ADMIN — SODIUM CHLORIDE, POTASSIUM CHLORIDE, SODIUM LACTATE AND CALCIUM CHLORIDE 125 ML/HR: 600; 310; 30; 20 INJECTION, SOLUTION INTRAVENOUS at 09:23

## 2019-10-30 RX ADMIN — PROPOFOL 50 MG: 10 INJECTION, EMULSION INTRAVENOUS at 10:01

## 2019-10-30 RX ADMIN — SODIUM CHLORIDE, POTASSIUM CHLORIDE, SODIUM LACTATE AND CALCIUM CHLORIDE: 600; 310; 30; 20 INJECTION, SOLUTION INTRAVENOUS at 09:31

## 2019-10-30 RX ADMIN — LIDOCAINE HYDROCHLORIDE 50 MG: 10 INJECTION, SOLUTION INFILTRATION; PERINEURAL at 09:52

## 2019-10-30 RX ADMIN — PROPOFOL 50 MG: 10 INJECTION, EMULSION INTRAVENOUS at 10:05

## 2019-10-30 RX ADMIN — PROPOFOL 100 MG: 10 INJECTION, EMULSION INTRAVENOUS at 09:52

## 2019-10-30 RX ADMIN — PROPOFOL 50 MG: 10 INJECTION, EMULSION INTRAVENOUS at 09:57

## 2019-10-30 RX ADMIN — PROPOFOL 50 MG: 10 INJECTION, EMULSION INTRAVENOUS at 10:09

## 2019-10-30 NOTE — ANESTHESIA PREPROCEDURE EVALUATION
Review of Systems/Medical History  Patient summary reviewed  Chart reviewed      Cardiovascular  Hyperlipidemia, Past MI > 6 months,    Pulmonary       GI/Hepatic            Endo/Other     GYN       Hematology   Musculoskeletal       Neurology   Psychology           Physical Exam    Airway    Mallampati score: I  TM Distance: >3 FB  Neck ROM: full     Dental   upper dentures and lower dentures,     Cardiovascular  Rhythm: regular, Rate: normal,     Pulmonary  Breath sounds clear to auscultation,     Other Findings        Anesthesia Plan  ASA Score- 3     Anesthesia Type- IV sedation with anesthesia with ASA Monitors  Additional Monitors:   Airway Plan:         Plan Factors- Patient instructed to abstain from smoking on day of procedure  Patient smoked on day of surgery  Induction- intravenous  Postoperative Plan-     Informed Consent- Anesthetic plan and risks discussed with patient  I personally reviewed this patient with the CRNA  Discussed and agreed on the Anesthesia Plan with the CRNA  Hiral Yost

## 2019-10-30 NOTE — DISCHARGE INSTRUCTIONS
Colonoscopy   WHAT YOU NEED TO KNOW:   A colonoscopy is a procedure to examine the inside of your colon (intestine) with a scope  Polyps or tissue growths may have been removed during your colonoscopy  It is normal to feel bloated and to have some abdominal discomfort  You should be passing gas  If you have hemorrhoids or you had polyps removed, you may have a small amount of bleeding  DISCHARGE INSTRUCTIONS:   Seek care immediately if:   · You have a large amount of bright red blood in your bowel movements  · Your abdomen is hard and firm and you have severe pain  · You have sudden trouble breathing  Contact your healthcare provider if:   · You develop a rash or hives  · You have a fever within 24 hours of your procedure  · You have not had a bowel movement for 3 days after your procedure  · You have questions or concerns about your condition or care  Activity:   · Do not lift, strain, or run  for 3 days after your procedure  · Rest after your procedure  You have been given medicine to relax you  Do not  drive or make important decisions until the day after your procedure  Return to your normal activity as directed  · Relieve gas and discomfort from bloating  by lying on your right side with a heating pad on your abdomen  You may need to take short walks to help the gas move out  Eat small meals until bloating is relieved  If you had polyps removed: For 7 days after your procedure:  · Do not  take aspirin  · Do not  go on long car rides  Help prevent constipation:   · Eat a variety of healthy foods  Healthy foods include fruit, vegetables, whole-grain breads, low-fat dairy products, beans, lean meat, and fish  Ask if you need to be on a special diet  Your healthcare provider may recommend that you eat high-fiber foods such as cooked beans  Fiber helps you have regular bowel movements  · Drink liquids as directed    Adults should drink between 9 and 13 eight-ounce cups of liquid every day  Ask what amount is best for you  For most people, good liquids to drink are water, juice, and milk  · Exercise as directed  Talk to your healthcare provider about the best exercise plan for you  Exercise can help prevent constipation, decrease your blood pressure and improve your health  Follow up with your healthcare provider as directed:  Write down your questions so you remember to ask them during your visits  © 2017 [address] [employer] [employer] Information is for End User's use only and may not be sold, redistributed or otherwise used for commercial purposes  All illustrations and images included in CareNotes® are the copyrighted property of [employer]  or [employer]  The above information is an  only  It is not intended as medical advice for individual conditions or treatments  Talk to your doctor, nurse or pharmacist before following any medical regimen to see if it is safe and effective for you

## 2019-10-30 NOTE — INTERVAL H&P NOTE
H&P reviewed  After examining the patient I find no changes in the patients condition since the H&P had been written      Vitals:    10/30/19 0915   BP: 130/78   Pulse: 86   Resp: 20   Temp: 98 5 °F (36 9 °C)   SpO2: 97%

## 2019-11-01 PROBLEM — M75.120 FULL THICKNESS ROTATOR CUFF TEAR: Status: ACTIVE | Noted: 2019-11-01

## 2019-11-01 PROBLEM — M77.10 LATERAL EPICONDYLITIS: Status: ACTIVE | Noted: 2019-11-01

## 2019-12-04 ENCOUNTER — TELEPHONE (OUTPATIENT)
Dept: PAIN MEDICINE | Facility: CLINIC | Age: 49
End: 2019-12-04

## 2019-12-04 ENCOUNTER — TELEPHONE (OUTPATIENT)
Dept: FAMILY MEDICINE CLINIC | Facility: CLINIC | Age: 49
End: 2019-12-04

## 2019-12-04 NOTE — TELEPHONE ENCOUNTER
S/w patient is trying to get a consult w/SPA  Pt has not seen his PCP for 6 months, pt's health ins is [facility]  Dx:  Chronic hip pain    Pt understands that due to his insurance he must have a referral from his PCP w/in the network  Pt has already called that office and [facility] to schedule/ discuss      No prior pm    pts cb #   [phone]

## 2019-12-04 NOTE — TELEPHONE ENCOUNTER
Patient called requesting a referral to pain management for his ongoing hip and back pain  Patient wants to see Dr Ravi Gutierres  Requesting a call when approved so he can call to schedule

## 2019-12-05 DIAGNOSIS — M25.512 LEFT ANTERIOR SHOULDER PAIN: Primary | ICD-10-CM

## 2020-09-17 ENCOUNTER — TELEPHONE (OUTPATIENT)
Dept: FAMILY MEDICINE CLINIC | Facility: CLINIC | Age: 50
End: 2020-09-17

## 2021-06-29 ENCOUNTER — TELEPHONE (OUTPATIENT)
Dept: FAMILY MEDICINE CLINIC | Facility: CLINIC | Age: 51
End: 2021-06-29

## 2024-05-02 ENCOUNTER — HOSPITAL ENCOUNTER (EMERGENCY)
Facility: HOSPITAL | Age: 54
End: 2024-05-02
Attending: EMERGENCY MEDICINE
Payer: COMMERCIAL

## 2024-05-02 ENCOUNTER — HOSPITAL ENCOUNTER (INPATIENT)
Facility: HOSPITAL | Age: 54
LOS: 6 days | Discharge: HOME | DRG: 753 | End: 2024-05-08
Attending: PSYCHIATRY & NEUROLOGY | Admitting: PSYCHIATRY & NEUROLOGY
Payer: COMMERCIAL

## 2024-05-02 VITALS
HEART RATE: 81 BPM | BODY MASS INDEX: 20.51 KG/M2 | HEIGHT: 69 IN | SYSTOLIC BLOOD PRESSURE: 119 MMHG | WEIGHT: 138.45 LBS | TEMPERATURE: 97.6 F | OXYGEN SATURATION: 94 % | DIASTOLIC BLOOD PRESSURE: 60 MMHG | RESPIRATION RATE: 18 BRPM

## 2024-05-02 DIAGNOSIS — J44.9 COPD (CHRONIC OBSTRUCTIVE PULMONARY DISEASE) (HCC): ICD-10-CM

## 2024-05-02 DIAGNOSIS — F31.63 BIPOLAR DISORDER, CURRENT EPISODE MIXED, SEVERE, UNSPECIFIED WHETHER PSYCHOTIC FEATURES (HCC): Primary | ICD-10-CM

## 2024-05-02 DIAGNOSIS — F32.A DEPRESSION WITH SUICIDAL IDEATION: Primary | ICD-10-CM

## 2024-05-02 DIAGNOSIS — M77.10 LATERAL EPICONDYLITIS: ICD-10-CM

## 2024-05-02 DIAGNOSIS — F51.01 PRIMARY INSOMNIA: ICD-10-CM

## 2024-05-02 DIAGNOSIS — M75.120 FULL THICKNESS ROTATOR CUFF TEAR: ICD-10-CM

## 2024-05-02 DIAGNOSIS — R10.9 ABDOMINAL CRAMPS: ICD-10-CM

## 2024-05-02 DIAGNOSIS — J06.9 URI WITH COUGH AND CONGESTION: ICD-10-CM

## 2024-05-02 DIAGNOSIS — T14.91XA SUICIDE ATTEMPT (HCC): ICD-10-CM

## 2024-05-02 DIAGNOSIS — K51.90 ULCERATIVE COLITIS (HCC): ICD-10-CM

## 2024-05-02 DIAGNOSIS — L29.9 PRURITUS: ICD-10-CM

## 2024-05-02 DIAGNOSIS — E55.9 VITAMIN D DEFICIENCY: ICD-10-CM

## 2024-05-02 DIAGNOSIS — Z72.0 TOBACCO ABUSE: ICD-10-CM

## 2024-05-02 DIAGNOSIS — E53.8 VITAMIN B12 DEFICIENCY: ICD-10-CM

## 2024-05-02 DIAGNOSIS — R45.851 DEPRESSION WITH SUICIDAL IDEATION: Primary | ICD-10-CM

## 2024-05-02 DIAGNOSIS — E78.5 DYSLIPIDEMIA: ICD-10-CM

## 2024-05-02 DIAGNOSIS — M54.50 LUMBAGO: ICD-10-CM

## 2024-05-02 LAB
ATRIAL RATE: 65 BPM
BASOPHILS # BLD AUTO: 0.04 THOUSANDS/ÂΜL (ref 0–0.1)
BASOPHILS NFR BLD AUTO: 1 % (ref 0–1)
EOSINOPHIL # BLD AUTO: 0.08 THOUSAND/ÂΜL (ref 0–0.61)
EOSINOPHIL NFR BLD AUTO: 1 % (ref 0–6)
ERYTHROCYTE [DISTWIDTH] IN BLOOD BY AUTOMATED COUNT: 12.5 % (ref 11.6–15.1)
ETHANOL EXG-MCNC: 0 MG/DL
FLUAV RNA RESP QL NAA+PROBE: NEGATIVE
FLUBV RNA RESP QL NAA+PROBE: NEGATIVE
HCT VFR BLD AUTO: 43.4 % (ref 36.5–49.3)
HGB BLD-MCNC: 15.1 G/DL (ref 12–17)
IMM GRANULOCYTES # BLD AUTO: 0.02 THOUSAND/UL (ref 0–0.2)
IMM GRANULOCYTES NFR BLD AUTO: 0 % (ref 0–2)
LYMPHOCYTES # BLD AUTO: 2.78 THOUSANDS/ÂΜL (ref 0.6–4.47)
LYMPHOCYTES NFR BLD AUTO: 36 % (ref 14–44)
MCH RBC QN AUTO: 32.3 PG (ref 26.8–34.3)
MCHC RBC AUTO-ENTMCNC: 34.8 G/DL (ref 31.4–37.4)
MCV RBC AUTO: 93 FL (ref 82–98)
MONOCYTES # BLD AUTO: 0.5 THOUSAND/ÂΜL (ref 0.17–1.22)
MONOCYTES NFR BLD AUTO: 7 % (ref 4–12)
NEUTROPHILS # BLD AUTO: 4.32 THOUSANDS/ÂΜL (ref 1.85–7.62)
NEUTS SEG NFR BLD AUTO: 55 % (ref 43–75)
NRBC BLD AUTO-RTO: 0 /100 WBCS
P AXIS: 65 DEGREES
PLATELET # BLD AUTO: 193 THOUSANDS/UL (ref 149–390)
PMV BLD AUTO: 9 FL (ref 8.9–12.7)
PR INTERVAL: 212 MS
QRS AXIS: 93 DEGREES
QRSD INTERVAL: 96 MS
QT INTERVAL: 408 MS
QTC INTERVAL: 424 MS
RBC # BLD AUTO: 4.68 MILLION/UL (ref 3.88–5.62)
RSV RNA RESP QL NAA+PROBE: NEGATIVE
SARS-COV-2 RNA RESP QL NAA+PROBE: NEGATIVE
T WAVE AXIS: 75 DEGREES
TSH SERPL DL<=0.05 MIU/L-ACNC: 2.66 UIU/ML (ref 0.45–4.5)
VENTRICULAR RATE: 65 BPM
WBC # BLD AUTO: 7.74 THOUSAND/UL (ref 4.31–10.16)

## 2024-05-02 PROCEDURE — 85025 COMPLETE CBC W/AUTO DIFF WBC: CPT | Performed by: EMERGENCY MEDICINE

## 2024-05-02 PROCEDURE — 36415 COLL VENOUS BLD VENIPUNCTURE: CPT | Performed by: EMERGENCY MEDICINE

## 2024-05-02 PROCEDURE — 99285 EMERGENCY DEPT VISIT HI MDM: CPT | Performed by: EMERGENCY MEDICINE

## 2024-05-02 PROCEDURE — 82075 ASSAY OF BREATH ETHANOL: CPT | Performed by: EMERGENCY MEDICINE

## 2024-05-02 PROCEDURE — 93005 ELECTROCARDIOGRAM TRACING: CPT

## 2024-05-02 PROCEDURE — 80053 COMPREHEN METABOLIC PANEL: CPT | Performed by: EMERGENCY MEDICINE

## 2024-05-02 PROCEDURE — 90471 IMMUNIZATION ADMIN: CPT

## 2024-05-02 PROCEDURE — 93010 ELECTROCARDIOGRAM REPORT: CPT | Performed by: INTERNAL MEDICINE

## 2024-05-02 PROCEDURE — 0241U HB NFCT DS VIR RESP RNA 4 TRGT: CPT | Performed by: EMERGENCY MEDICINE

## 2024-05-02 PROCEDURE — 84443 ASSAY THYROID STIM HORMONE: CPT | Performed by: EMERGENCY MEDICINE

## 2024-05-02 PROCEDURE — 99285 EMERGENCY DEPT VISIT HI MDM: CPT

## 2024-05-02 PROCEDURE — 96372 THER/PROPH/DIAG INJ SC/IM: CPT

## 2024-05-02 PROCEDURE — 90715 TDAP VACCINE 7 YRS/> IM: CPT | Performed by: EMERGENCY MEDICINE

## 2024-05-02 RX ORDER — ACETAMINOPHEN 325 MG/1
650 TABLET ORAL EVERY 4 HOURS PRN
Status: CANCELLED | OUTPATIENT
Start: 2024-05-02

## 2024-05-02 RX ORDER — NICOTINE 21 MG/24HR
1 PATCH, TRANSDERMAL 24 HOURS TRANSDERMAL DAILY
Status: CANCELLED | OUTPATIENT
Start: 2024-05-03

## 2024-05-02 RX ORDER — LORAZEPAM 2 MG/ML
1 INJECTION INTRAMUSCULAR
Status: CANCELLED | OUTPATIENT
Start: 2024-05-02

## 2024-05-02 RX ORDER — ACETAMINOPHEN 325 MG/1
975 TABLET ORAL EVERY 6 HOURS PRN
Status: DISCONTINUED | OUTPATIENT
Start: 2024-05-02 | End: 2024-05-08 | Stop reason: HOSPADM

## 2024-05-02 RX ORDER — ACETAMINOPHEN 325 MG/1
650 TABLET ORAL EVERY 4 HOURS PRN
Status: DISCONTINUED | OUTPATIENT
Start: 2024-05-02 | End: 2024-05-08 | Stop reason: HOSPADM

## 2024-05-02 RX ORDER — OLANZAPINE 2.5 MG/1
2.5 TABLET, FILM COATED ORAL
Status: DISCONTINUED | OUTPATIENT
Start: 2024-05-02 | End: 2024-05-08 | Stop reason: HOSPADM

## 2024-05-02 RX ORDER — BENZTROPINE MESYLATE 1 MG/1
0.5 TABLET ORAL
Status: CANCELLED | OUTPATIENT
Start: 2024-05-02

## 2024-05-02 RX ORDER — LORAZEPAM 2 MG/ML
1 INJECTION INTRAMUSCULAR
Status: DISCONTINUED | OUTPATIENT
Start: 2024-05-02 | End: 2024-05-08 | Stop reason: HOSPADM

## 2024-05-02 RX ORDER — LORAZEPAM 1 MG/1
1 TABLET ORAL
Status: CANCELLED | OUTPATIENT
Start: 2024-05-02

## 2024-05-02 RX ORDER — MAGNESIUM HYDROXIDE/ALUMINUM HYDROXICE/SIMETHICONE 120; 1200; 1200 MG/30ML; MG/30ML; MG/30ML
30 SUSPENSION ORAL EVERY 4 HOURS PRN
Status: DISCONTINUED | OUTPATIENT
Start: 2024-05-02 | End: 2024-05-08 | Stop reason: HOSPADM

## 2024-05-02 RX ORDER — LORAZEPAM 0.5 MG/1
0.5 TABLET ORAL
Status: DISCONTINUED | OUTPATIENT
Start: 2024-05-02 | End: 2024-05-08 | Stop reason: HOSPADM

## 2024-05-02 RX ORDER — OLANZAPINE 2.5 MG/1
5 TABLET, FILM COATED ORAL
Status: CANCELLED | OUTPATIENT
Start: 2024-05-02

## 2024-05-02 RX ORDER — DIPHENHYDRAMINE HYDROCHLORIDE 50 MG/ML
50 INJECTION INTRAMUSCULAR; INTRAVENOUS ONCE AS NEEDED
Status: COMPLETED | OUTPATIENT
Start: 2024-05-02 | End: 2024-05-02

## 2024-05-02 RX ORDER — LORAZEPAM 0.5 MG/1
0.5 TABLET ORAL
Status: CANCELLED | OUTPATIENT
Start: 2024-05-02

## 2024-05-02 RX ORDER — TRAZODONE HYDROCHLORIDE 50 MG/1
50 TABLET ORAL
Status: DISCONTINUED | OUTPATIENT
Start: 2024-05-02 | End: 2024-05-08 | Stop reason: HOSPADM

## 2024-05-02 RX ORDER — HYDROXYZINE HYDROCHLORIDE 25 MG/1
25 TABLET, FILM COATED ORAL
Status: CANCELLED | OUTPATIENT
Start: 2024-05-02

## 2024-05-02 RX ORDER — ACETAMINOPHEN 325 MG/1
975 TABLET ORAL EVERY 6 HOURS PRN
Status: CANCELLED | OUTPATIENT
Start: 2024-05-02

## 2024-05-02 RX ORDER — OLANZAPINE 2.5 MG/1
2.5 TABLET, FILM COATED ORAL
Status: CANCELLED | OUTPATIENT
Start: 2024-05-02

## 2024-05-02 RX ORDER — LORAZEPAM 2 MG/ML
2 INJECTION INTRAMUSCULAR ONCE AS NEEDED
Status: COMPLETED | OUTPATIENT
Start: 2024-05-02 | End: 2024-05-02

## 2024-05-02 RX ORDER — NICOTINE 21 MG/24HR
1 PATCH, TRANSDERMAL 24 HOURS TRANSDERMAL DAILY
Status: DISCONTINUED | OUTPATIENT
Start: 2024-05-03 | End: 2024-05-08 | Stop reason: HOSPADM

## 2024-05-02 RX ORDER — OLANZAPINE 10 MG/2ML
5 INJECTION, POWDER, FOR SOLUTION INTRAMUSCULAR
Status: DISCONTINUED | OUTPATIENT
Start: 2024-05-02 | End: 2024-05-08 | Stop reason: HOSPADM

## 2024-05-02 RX ORDER — HYDROXYZINE HYDROCHLORIDE 25 MG/1
25 TABLET, FILM COATED ORAL
Status: DISCONTINUED | OUTPATIENT
Start: 2024-05-02 | End: 2024-05-08 | Stop reason: HOSPADM

## 2024-05-02 RX ORDER — NICOTINE 21 MG/24HR
21 PATCH, TRANSDERMAL 24 HOURS TRANSDERMAL ONCE
Status: DISCONTINUED | OUTPATIENT
Start: 2024-05-02 | End: 2024-05-02 | Stop reason: HOSPADM

## 2024-05-02 RX ORDER — LORAZEPAM 1 MG/1
1 TABLET ORAL
Status: DISCONTINUED | OUTPATIENT
Start: 2024-05-02 | End: 2024-05-08 | Stop reason: HOSPADM

## 2024-05-02 RX ORDER — MESALAMINE 1.2 G/1
1200 TABLET, DELAYED RELEASE ORAL
COMMUNITY
End: 2024-05-08

## 2024-05-02 RX ORDER — BENZTROPINE MESYLATE 0.5 MG/1
0.5 TABLET ORAL
Status: DISCONTINUED | OUTPATIENT
Start: 2024-05-02 | End: 2024-05-08 | Stop reason: HOSPADM

## 2024-05-02 RX ORDER — HALOPERIDOL 5 MG/ML
5 INJECTION INTRAMUSCULAR ONCE AS NEEDED
Status: COMPLETED | OUTPATIENT
Start: 2024-05-02 | End: 2024-05-02

## 2024-05-02 RX ORDER — OLANZAPINE 5 MG/1
5 TABLET ORAL
Status: DISCONTINUED | OUTPATIENT
Start: 2024-05-02 | End: 2024-05-08 | Stop reason: HOSPADM

## 2024-05-02 RX ORDER — OLANZAPINE 10 MG/2ML
5 INJECTION, POWDER, FOR SOLUTION INTRAMUSCULAR
Status: CANCELLED | OUTPATIENT
Start: 2024-05-02

## 2024-05-02 RX ORDER — TRAZODONE HYDROCHLORIDE 50 MG/1
50 TABLET ORAL
Status: CANCELLED | OUTPATIENT
Start: 2024-05-02

## 2024-05-02 RX ORDER — POLYETHYLENE GLYCOL 3350 17 G/17G
17 POWDER, FOR SOLUTION ORAL DAILY PRN
Status: DISCONTINUED | OUTPATIENT
Start: 2024-05-02 | End: 2024-05-08 | Stop reason: HOSPADM

## 2024-05-02 RX ADMIN — LORAZEPAM 2 MG: 2 INJECTION INTRAMUSCULAR; INTRAVENOUS at 04:05

## 2024-05-02 RX ADMIN — TRAZODONE HYDROCHLORIDE 50 MG: 50 TABLET ORAL at 21:04

## 2024-05-02 RX ADMIN — NICOTINE 21 MG: 21 PATCH, EXTENDED RELEASE TRANSDERMAL at 05:00

## 2024-05-02 RX ADMIN — TETANUS TOXOID, REDUCED DIPHTHERIA TOXOID AND ACELLULAR PERTUSSIS VACCINE, ADSORBED 0.5 ML: 5; 2.5; 8; 8; 2.5 SUSPENSION INTRAMUSCULAR at 05:00

## 2024-05-02 RX ADMIN — DIPHENHYDRAMINE HYDROCHLORIDE 50 MG: 50 INJECTION, SOLUTION INTRAMUSCULAR; INTRAVENOUS at 04:05

## 2024-05-02 RX ADMIN — HALOPERIDOL LACTATE 5 MG: 5 INJECTION, SOLUTION INTRAMUSCULAR at 04:05

## 2024-05-02 NOTE — ED NOTES
"Pt presents to the ED from home accompanied by police who petitioned 302 alleging, \"I was called by an ICU nurse that Ace Roy wanted to kill himself because he couldn't learn about his wife's condition. When I arrived he told me he wanted to kill himself and showed me his wrist that he had cut.\" CW read and reviewed 302 w/pt. CW read pt 302 rights. Pt confirms having attempted to commit suicide. Pt confirms having SA in the past via shooting self. Pt does not answer if pt has HI's. Pt appears irritable and does not engage in extensive conversation. Pt provides little details and insight. Pt confirms having SI's. Pt reports sleep and appetite are not so good. Pt does not report having a formal MH dx. Pt confirms having called for help. Pt resides alone at this time and confirms \"wife is in the ICU and almost .\" Pt denies AVH's. Pt reports smoking aprox 2 packs of cigarettes daily, denies use of illegal substances and endorses using occiasional ETOH. Pt denies any legal issues and states, \"stomach problems\" for medical conditions. Pt is calm, irritable, maintains poor eye contact. Pt keeps eyes closed during assessment. Pt states, \"I will only go local and if I can't stay close than I'm going home.\" CW explained to pt, CW could not guarantee placement at a hospital which is local, specifically [facility], as pt was requesting. Pt stated, \"Then I'm not going to any hospital.\" CW attempted to explain to pt the seriousness of the events which took place and the recommendation of the ED doctor for IP tx. Pt states, \"I don't care I'm not going.\" CW advised pt, ED doctor will uphold the 302. Pt refused to engage in further conversation and remained silent.     Dr. Guzmán upheld the 302.    KAYLA sent clinicals to INTAKE    TDS, KAYLA  "

## 2024-05-02 NOTE — ED NOTES
KAYLA received return call from [facility]    Insurance Authorization for admission:   Phone call placed to [facility]  Phone number: [phone]     Spoke to [facility]     5 days approved.  Level of care: 302  Review on 5/6/2024   Authorization # [id number]        Eligibility Verification System checked - ([phone]).  Online system / automated system indicates: **    Insurance Authorization for Transportation:    Phone call placed to **.   Phone number **.   Spoke to **.   Authorization #: **    DESI, KAYLA

## 2024-05-02 NOTE — ED NOTES
Patient is accepted at [facility].  Patient is accepted by Dr. Garcia per INTAKE.     Transportation is arranged with [facility].    Transportation is scheduled for 1430   Patient may go to the floor at 1430 or later.          Nurse report is to be called to [phone] prior to patient transfer.     TDS, CW

## 2024-05-02 NOTE — LETTER
[facility] EMERGENCY DEPARTMENT  [address]  [place] [address]  Dept: [phone]      EMTALA TRANSFER CONSENT    NAME Ace Roy                              1970                              MRN [id number]    I have been informed of my rights regarding examination, treatment, and transfer   by Dr. Tish Lock DO    Benefits: Specialized equipment and/or services available at the receiving facility (Include comment)________________________    Risks: Potential for delay in receiving treatment      Consent for Transfer:  I acknowledge that my medical condition has been evaluated and explained to me by the emergency department physician or other qualified medical person and/or my attending physician, who has recommended that I be transferred to the service of  Accepting Physician: Dr. Garcia at Accepting Facility Name, City & State : [facility], [address] [facility], [facility] [address]. The above potential benefits of such transfer, the potential risks associated with such transfer, and the probable risks of not being transferred have been explained to me, and I fully understand them.  The doctor has explained that, in my case, the benefits of transfer outweigh the risks.  I agree to be transferred.    I authorize the performance of emergency medical procedures and treatments upon me in both transit and upon arrival at the receiving facility.  Additionally, I authorize the release of any and all medical records to the receiving facility and request they be transported with me, if possible.  I understand that the safest mode of transportation during a medical emergency is an ambulance and that the Hospital advocates the use of this mode of transport. Risks of traveling to the receiving facility by car, including absence of medical control, life sustaining equipment, such as oxygen, and medical personnel has been explained to me and I fully understand them.    (GENIA  CORRECT BOX BELOW)  [X]  I consent to the stated transfer and to be transported by ambulance/helicopter.  [  ]  I consent to the stated transfer, but refuse transportation by ambulance and accept full responsibility for my transportation by car.  I understand the risks of non-ambulance transfers and I exonerate the Hospital and its staff from any deterioration in my condition that results from this refusal.    X___________________________________________    DATE  24  TIME________  Signature of patient or legally responsible individual signing on patient behalf           RELATIONSHIP TO PATIENT_________________________                  Provider Certification    NAME Ace Roy                         1970                              MRN [id number]    A medical screening exam was performed on the above named patient.  Based on the examination:    Condition Necessitating Transfer The primary encounter diagnosis was Depression with suicidal ideation. Diagnoses of Suicide attempt (HCC), Lateral epicondylitis, and Full thickness rotator cuff tear were also pertinent to this visit.    Patient Condition: The patient has been stabilized such that within reasonable medical probability, no material deterioration of the patient condition or the condition of the unborn child(macho) is likely to result from the transfer    Reason for Transfer: Level of Care needed not available at this facility    Transfer Requirements: [facility], [address] [place] [address]   Space available and qualified personnel available for treatment as acknowledged by Christen Branch, , [phone]  Agreed to accept transfer and to provide appropriate medical treatment as acknowledged by       Dr. Garcia  Appropriate medical records of the examination and treatment of the patient are provided at the time of transfer   STAFF INITIAL WHEN COMPLETED _______  Transfer will be performed by qualified personnel from                   and appropriate transfer equipment as required, including the use of necessary and appropriate life support measures.    Provider Certification: I have examined the patient and explained the following risks and benefits of being transferred/refusing transfer to the patient/family:         Based on these reasonable risks and benefits to the patient and/or the unborn child(macho), and based upon the information available at the time of the patient’s examination, I certify that the medical benefits reasonably to be expected from the provision of appropriate medical treatments at another medical facility outweigh the increasing risks, if any, to the individual’s medical condition, and in the case of labor to the unborn child, from effecting the transfer.    X____________________________________________ DATE 05/02/24        TIME_______      ORIGINAL - SEND TO MEDICAL RECORDS   COPY - SEND WITH PATIENT DURING TRANSFER

## 2024-05-02 NOTE — ED NOTES
Dietary came by room to get food order, dietary is ordering a house tray for Pt. Pt still has not eaten breakfast tray.      Barron Arreguin  05/02/24 1045       Barron Arreguin  05/02/24 1048

## 2024-05-02 NOTE — ED NOTES
KAYLA sent completed 302 documents to [facility] [place] office via email.    TDS, CW

## 2024-05-02 NOTE — ED NOTES
CW completed pt's transportation packet and provided to pt's nurse.     CW provided INTAKE w/ETA    TDS, CW

## 2024-05-02 NOTE — NURSING NOTE
Patient was admitted to unit on 302 from [facility]. Patient has moderate anxiety and mild depression. Currently has SI with no plan and has contracted for safety if he is feeling suicidal he will come to staff. Patient feels safe on unit. Denies HI and no hallucinations. Continued care and 7 minute safety checks in progress.

## 2024-05-02 NOTE — ED NOTES
CW placed call to [facility], spoke w/Mary. As per Mary, a request for return call to complete pre-cert has been placed within the queue.    TDS, CW

## 2024-05-02 NOTE — ED PROVIDER NOTES
"Pt Name: Ace Roy  MRN: [id number]  Birthdate 1970  Age/Sex: 53 y.o. male  Date of evaluation: 5/2/2024  PCP: No primary care provider on file.    CHIEF COMPLAINT    Chief Complaint   Patient presents with    Psychiatric Evaluation     Pt arrived in custody of police, petitioning 302 SI with a plan. Pt stated I want to \"call it quits.\" Officer reports pt reported \"if my knife was sharper I wouldn't be here.\"         HPI    53 y.o. male presenting with suicide attempt.  Patient was brought in by police under 302, apparently he called the ICU where his wife is currently admitted and told them to give her a message that he was going to kill himself.  Staff at that ICU called police who brought the patient in.  Patient was found with a knife cutting at his wrist, brought to the ER.  Patient states that if the knife was sharper he would be dead he also states that if his son had not taken away his guns he would be dead and that he would like to shoot himself.  Patient is requesting that he be discharged so that he can kill himself.  He states that he can no longer see his wife of 40 years, states that he is losing his house and does not have any money, states he has nothing left to live for him and the only thing to do is to kill himself and wipe the slate clean \".  He denies fever, trauma, numbness, weakness, other symptoms.  Patient states he has no idea when his last tetanus shot was, states he smokes 2 packs a day.      HPI      Past Medical and Surgical History    Past Medical History:   Diagnosis Date    Chronic pain disorder     Hypercholesteremia     Myocardial infarction (HCC) 2016       Past Surgical History:   Procedure Laterality Date    JOINT REPLACEMENT Left 07/2019    hip replacement    MANDIBLE SURGERY         Family History   Problem Relation Age of Onset    Lung cancer Mother     Heart attack Father        Social History     Tobacco Use    Smoking status: Every Day     Current packs/day: 0.50 "     Types: Cigarettes    Smokeless tobacco: Never   Substance Use Topics    Alcohol use: Never    Drug use: Never           Allergies    Allergies   Allergen Reactions    Asa [Aspirin] Shortness Of Breath    Penicillins Hives    Tegretol [Carbamazepine] Dizziness and Confusion    Tramadol Vomiting     Other reaction(s): Nausea and/or vomiting       Home Medications    Prior to Admission medications    Medication Sig Start Date End Date Taking? Authorizing Provider   naproxen (NAPROSYN) 500 mg tablet Take 1 tablet (500 mg total) by mouth 2 (two) times a day with meals 3/18/19   HARMAN Dixon   oxyCODONE (OxyCONTIN) 10 mg 12 hr tablet Take 10 mg by mouth every 6 (six) hours    Historical Provider, MD           Review of Systems    Review of Systems   Constitutional:  Negative for appetite change, chills and diaphoresis.   HENT:  Negative for drooling, facial swelling, trouble swallowing and voice change.    Respiratory:  Negative for apnea, shortness of breath and wheezing.    Cardiovascular:  Negative for chest pain and leg swelling.   Gastrointestinal:  Negative for abdominal distention, abdominal pain, diarrhea, nausea and vomiting.   Genitourinary:  Negative for dysuria and urgency.   Musculoskeletal:  Negative for arthralgias, back pain, gait problem and neck pain.   Skin:  Negative for color change, rash and wound.   Neurological:  Negative for seizures, speech difficulty, weakness and headaches.   Psychiatric/Behavioral:  Positive for dysphoric mood, self-injury and suicidal ideas. Negative for agitation and behavioral problems. The patient is not nervous/anxious.            All other systems reviewed and negative.    Physical Exam      ED Triage Vitals [05/02/24 0350]   Temperature Pulse Respirations Blood Pressure SpO2   97.6 °F (36.4 °C) 72 18 135/85 93 %      Temp Source Heart Rate Source Patient Position - Orthostatic VS BP Location FiO2 (%)   Oral Monitor Sitting Right arm --      Pain Score        --               Physical Exam  Vitals and nursing note reviewed.   Constitutional:       General: He is not in acute distress.     Appearance: He is well-developed. He is not ill-appearing, toxic-appearing or diaphoretic.   HENT:      Head: Normocephalic and atraumatic.      Right Ear: External ear normal.      Left Ear: External ear normal.      Nose: Nose normal. No congestion or rhinorrhea.      Mouth/Throat:      Mouth: Mucous membranes are moist.      Pharynx: Oropharynx is clear. No oropharyngeal exudate or posterior oropharyngeal erythema.   Eyes:      Conjunctiva/sclera: Conjunctivae normal.      Pupils: Pupils are equal, round, and reactive to light.   Neck:      Trachea: No tracheal deviation.   Cardiovascular:      Rate and Rhythm: Normal rate and regular rhythm.      Pulses: Normal pulses.      Heart sounds: Normal heart sounds. No murmur heard.  Pulmonary:      Effort: Pulmonary effort is normal. No respiratory distress.      Breath sounds: Normal breath sounds. No stridor. No wheezing or rales.   Abdominal:      General: There is no distension.      Palpations: Abdomen is soft.      Tenderness: There is no abdominal tenderness. There is no guarding or rebound.   Musculoskeletal:         General: No deformity. Normal range of motion.      Cervical back: Normal range of motion and neck supple. No rigidity or tenderness.      Right lower leg: No edema.      Left lower leg: No edema.   Skin:     General: Skin is warm and dry.      Capillary Refill: Capillary refill takes less than 2 seconds.      Findings: No rash.   Neurological:      Mental Status: He is alert and oriented to person, place, and time.   Psychiatric:      Comments: Patient somewhat agitated, endorsing dysphoric mood, states he has nothing left to live for and only wants to go home and kill himself.  Patient endorses active suicidal ideation with intent and plan.  Patient details several plans on how he might do so, including shooting  himself with a gun, cutting his wrists or stabbing himself with a knife.                Diagnostic Results  EKG Interpretation    Rate:  65  BPM  Rhythm: Sinus rhythm with occasional PVCs  Axis:  Normal   Intervals: History AV block QTc  424 ms  Q waves:  No pathologic Q waves   T waves:  Normal   ST segments:  No significant elevations or depressions     Impression:  Normal sinus rhythm with first-degree AV block and occasional PVCs but without evidence of acute ischemia or significant arrhythmia      EKG for comparison: None available    EKG interpreted by me.       Labs:    Results Reviewed       Procedure Component Value Units Date/Time    FLU/RSV/COVID - if FLU/RSV clinically relevant [[id number]]  (Normal) Collected: 05/02/24 0416    Lab Status: Final result Specimen: Nares from Nose Updated: 05/02/24 0502     SARS-CoV-2 Negative     INFLUENZA A PCR Negative     INFLUENZA B PCR Negative     RSV PCR Negative    Narrative:      FOR PEDIATRIC PATIENTS - copy/paste COVID Guidelines URL to browser: [email or web address]    SARS-CoV-2 assay is a Nucleic Acid Amplification assay intended for the  qualitative detection of nucleic acid from SARS-CoV-2 in nasopharyngeal  swabs. Results are for the presumptive identification of SARS-CoV-2 RNA.    Positive results are indicative of infection with SARS-CoV-2, the virus  causing COVID-19, but do not rule out bacterial infection or co-infection  with other viruses. Laboratories within the [place] and its  territories are required to report all positive results to the appropriate  public health authorities. Negative results do not preclude SARS-CoV-2  infection and should not be used as the sole basis for treatment or other  patient management decisions. Negative results must be combined with  clinical observations, patient history, and epidemiological information.  This test has not been FDA cleared or  approved.    This test has been authorized by FDA under an Emergency Use Authorization  (EUA). This test is only authorized for the duration of time the  declaration that circumstances exist justifying the authorization of the  emergency use of an in vitro diagnostic tests for detection of SARS-CoV-2  virus and/or diagnosis of COVID-19 infection under section 564(b)(1) of  the Act, 21 U.S.C. 360bbb-3(b)(1), unless the authorization is terminated  or revoked sooner. The test has been validated but independent review by FDA  and CLIA is pending.    Test performed using [employer] GeneXpert: This RT-PCR assay targets N2,  a region unique to SARS-CoV-2. A conserved region in the E-gene was chosen  for pan-Sarbecovirus detection which includes SARS-CoV-2.    According to CMS-2020-01-R, this platform meets the definition of high-throughput technology.    TSH, 3rd generation with Free T4 reflex [[id number]]  (Normal) Collected: 05/02/24 0416    Lab Status: Final result Specimen: Blood from Arm, Right Updated: 05/02/24 0455     TSH 3RD GENERATON 2.660 uIU/mL     Comprehensive metabolic panel [[id number]] Collected: 05/02/24 0416    Lab Status: Final result Specimen: Blood from Arm, Right Updated: 05/02/24 0442     Sodium 135 mmol/L      Potassium 3.8 mmol/L      Chloride 103 mmol/L      CO2 23 mmol/L      ANION GAP 9 mmol/L      BUN 13 mg/dL      Creatinine 0.87 mg/dL      Glucose 100 mg/dL      Calcium 9.3 mg/dL      AST 25 U/L      ALT 12 U/L      Alkaline Phosphatase 51 U/L      Total Protein 7.4 g/dL      Albumin 4.6 g/dL      Total Bilirubin 0.58 mg/dL      eGFR 97 ml/min/1.73sq m     Narrative:      National Kidney Disease Foundation guidelines for Chronic Kidney Disease (CKD):     Stage 1 with normal or high GFR (GFR > 90 mL/min/1.73 square meters)    Stage 2 Mild CKD (GFR = 60-89 mL/min/1.73 square meters)    Stage 3A Moderate CKD (GFR = 45-59 mL/min/1.73 square meters)    Stage 3B Moderate CKD (GFR = 30-44 mL/min/1.73  square meters)    Stage 4 Severe CKD (GFR = 15-29 mL/min/1.73 square meters)    Stage 5 End Stage CKD (GFR <15 mL/min/1.73 square meters)  Note: GFR calculation is accurate only with a steady state creatinine    CBC and differential [[id number]] Collected: 05/02/24 0416    Lab Status: Final result Specimen: Blood from Arm, Right Updated: 05/02/24 0423     WBC 7.74 Thousand/uL      RBC 4.68 Million/uL      Hemoglobin 15.1 g/dL      Hematocrit 43.4 %      MCV 93 fL      MCH 32.3 pg      MCHC 34.8 g/dL      RDW 12.5 %      MPV 9.0 fL      Platelets 193 Thousands/uL      nRBC 0 /100 WBCs      Segmented % 55 %      Immature Grans % 0 %      Lymphocytes % 36 %      Monocytes % 7 %      Eosinophils Relative 1 %      Basophils Relative 1 %      Absolute Neutrophils 4.32 Thousands/µL      Absolute Immature Grans 0.02 Thousand/uL      Absolute Lymphocytes 2.78 Thousands/µL      Absolute Monocytes 0.50 Thousand/µL      Eosinophils Absolute 0.08 Thousand/µL      Basophils Absolute 0.04 Thousands/µL     POCT alcohol breath test [[id number]]  (Normal) Resulted: 05/02/24 0405    Lab Status: Final result Updated: 05/02/24 0405     EXTBreath Alcohol 0.00    Rapid drug screen, urine [[id number]]     Lab Status: No result Specimen: Urine             All labs reviewed and utilized in the medical decision making process    Radiology:    No orders to display       All radiology studies independently viewed by me and interpreted by the radiologist.    Procedure    Procedures        ED Course of Care and Re-Assessments      Patient demanding to be released, discussed 302 petition by  with the patient.  Patient states that he does not care, demands to be released so that he can go kill himself.  Patient refusing to undress or allow himself to be searched, refusing to allow any screening medical testing.  We did discuss voluntary versus involuntary commitment, patient states he will leave instead.  302 upheld by myself, patient  sedated with lorazepam Haldol and Benadryl with resolution of agitation.    Medically cleared for inpatient psychiatric hospitalization.  Medications   nicotine (NICODERM CQ) 21 mg/24 hr TD 24 hr patch 21 mg (21 mg Transdermal Medication Applied 5/2/24 0500)   tetanus-diphtheria-acellular pertussis (BOOSTRIX) IM injection 0.5 mL (0.5 mL Intramuscular Given 5/2/24 0500)   haloperidol lactate (HALDOL) injection 5 mg (5 mg Intramuscular Given 5/2/24 0405)   diphenhydrAMINE (BENADRYL) injection 50 mg (50 mg Intramuscular Given 5/2/24 0405)   LORazepam (ATIVAN) injection 2 mg (2 mg Intramuscular Given 5/2/24 0405)           FINAL IMPRESSION    Final diagnoses:   Depression with suicidal ideation   Suicide attempt (HCC)         DISPOSITION/PLAN    53 y.o. male with history and symptoms above.  Vital signs reassuring, examination unremarkable other than abnormalities in Behavioral Health exam as above.  At this time, no evidence of acute intoxication, organic cause of Behavioral Health symptoms, sepsis, meningitis, encephalitis, or other systemic infection, significant trauma, other life threatening condition. Patient medically cleared for inpatient psychiatric admission and pending crisis evaluation.  Hemodynamically stable and comfortable at time of signout to Dr. Lock.    Time reflects when diagnosis was documented in both MDM as applicable and the Disposition within this note       Time User Action Codes Description Comment    5/2/2024  6:54 AM Hector Guzmán Add [F32.A,  R45.851] Depression with suicidal ideation     5/2/2024  6:54 AM Hector Guzmán [T14.91XA] Suicide attempt (HCC)           ED Disposition       ED Disposition   Transfer to Behavioral Health Condition   --    Date/Time   Thu May 2, 2024  6:54 AM    Comment   Ace Roy should be transferred out to [facility] and has been medically cleared.               Follow-up Information    None           PATIENT REFERRED TO:    No follow-up  "provider specified.    DISCHARGE MEDICATIONS:    Patient's Medications   Discharge Prescriptions    No medications on file       No discharge procedures on file.         Hector Guzmán MD    Portions of the record may have been created with voice recognition software.  Occasional wrong word or \"sound alike\" substitutions may have occurred due to the inherent limitations of voice recognition software.  Please read the chart carefully and recognize, using context, where substitutions have occurred     Hector Guzmán MD  05/02/24 0704    "

## 2024-05-03 PROBLEM — F31.63 BIPOLAR DISORDER, CURRENT EPISODE MIXED, SEVERE (HCC): Status: ACTIVE | Noted: 2024-05-03

## 2024-05-03 LAB
25(OH)D3 SERPL-MCNC: 14.5 NG/ML (ref 30–100)
CHOLEST SERPL-MCNC: 245 MG/DL
FOLATE SERPL-MCNC: 8.7 NG/ML
HDLC SERPL-MCNC: 42 MG/DL
LDLC SERPL CALC-MCNC: 179 MG/DL (ref 0–100)
TRIGL SERPL-MCNC: 119 MG/DL
VIT B12 SERPL-MCNC: 346 PG/ML (ref 180–914)

## 2024-05-03 PROCEDURE — 80061 LIPID PANEL: CPT

## 2024-05-03 PROCEDURE — 99223 1ST HOSP IP/OBS HIGH 75: CPT | Performed by: STUDENT IN AN ORGANIZED HEALTH CARE EDUCATION/TRAINING PROGRAM

## 2024-05-03 PROCEDURE — 82607 VITAMIN B-12: CPT

## 2024-05-03 PROCEDURE — 82306 VITAMIN D 25 HYDROXY: CPT

## 2024-05-03 PROCEDURE — 82746 ASSAY OF FOLIC ACID SERUM: CPT

## 2024-05-03 RX ORDER — OLANZAPINE 5 MG/1
5 TABLET ORAL
Status: DISCONTINUED | OUTPATIENT
Start: 2024-05-03 | End: 2024-05-04

## 2024-05-03 RX ORDER — GUAIFENESIN 600 MG/1
600 TABLET, EXTENDED RELEASE ORAL EVERY 12 HOURS SCHEDULED
Status: DISCONTINUED | OUTPATIENT
Start: 2024-05-03 | End: 2024-05-08 | Stop reason: HOSPADM

## 2024-05-03 RX ORDER — BUDESONIDE AND FORMOTEROL FUMARATE DIHYDRATE 80; 4.5 UG/1; UG/1
2 AEROSOL RESPIRATORY (INHALATION) 2 TIMES DAILY
Status: DISCONTINUED | OUTPATIENT
Start: 2024-05-03 | End: 2024-05-08 | Stop reason: HOSPADM

## 2024-05-03 RX ORDER — LIDOCAINE 50 MG/G
1 PATCH TOPICAL DAILY PRN
Status: DISCONTINUED | OUTPATIENT
Start: 2024-05-03 | End: 2024-05-04

## 2024-05-03 RX ORDER — MESALAMINE 400 MG/1
800 CAPSULE, DELAYED RELEASE ORAL 3 TIMES DAILY
Status: DISCONTINUED | OUTPATIENT
Start: 2024-05-03 | End: 2024-05-08 | Stop reason: HOSPADM

## 2024-05-03 RX ORDER — DICYCLOMINE HYDROCHLORIDE 10 MG/1
10 CAPSULE ORAL 4 TIMES DAILY PRN
Status: DISCONTINUED | OUTPATIENT
Start: 2024-05-03 | End: 2024-05-08 | Stop reason: HOSPADM

## 2024-05-03 RX ADMIN — NICOTINE 1 PATCH: 21 PATCH, EXTENDED RELEASE TRANSDERMAL at 08:27

## 2024-05-03 RX ADMIN — OLANZAPINE 5 MG: 5 TABLET, FILM COATED ORAL at 20:58

## 2024-05-03 RX ADMIN — BUDESONIDE AND FORMOTEROL FUMARATE DIHYDRATE 2 PUFF: 80; 4.5 AEROSOL RESPIRATORY (INHALATION) at 17:39

## 2024-05-03 RX ADMIN — GUAIFENESIN 600 MG: 600 TABLET ORAL at 20:58

## 2024-05-03 RX ADMIN — ACETAMINOPHEN 975 MG: 325 TABLET ORAL at 20:55

## 2024-05-03 RX ADMIN — GUAIFENESIN 600 MG: 600 TABLET ORAL at 12:37

## 2024-05-03 RX ADMIN — MESALAMINE 800 MG: 400 CAPSULE, DELAYED RELEASE ORAL at 15:23

## 2024-05-03 RX ADMIN — MESALAMINE 800 MG: 400 CAPSULE, DELAYED RELEASE ORAL at 20:58

## 2024-05-03 RX ADMIN — TRAZODONE HYDROCHLORIDE 50 MG: 50 TABLET ORAL at 21:41

## 2024-05-03 NOTE — H&P
Homero Roy#  :1970 M  MRN:[id number]    CSN:[id number]  Adm Date: 2024 1552  3:52 PM   ATT PHY: Omar Garcia Md  [facility]         Chief Complaint: suicidal ideations      History of Presenting Illness: Ace Roy is a(n) 53 y.o. year old male who is admitted to [facility] on involuntary 302 commitment basis.  Patient originally presented to [facility] ED on 2024 on 302 petitioned by police for suicidal threats.    Patient examined at bedside.  Patient reports history of emphysema.  He smokes 2 pack/day.  He has an occasional productive cough worse at night.  Denies being on inhalers in the past but would like to try.  Also requesting Mucinex which he takes at home.  No shortness of breath, chest pain, fevers, chills.    Allergies   Allergen Reactions    Asa [Aspirin] Shortness Of Breath    Penicillins Hives    Tegretol [Carbamazepine] Dizziness and Confusion    Tramadol Vomiting     Other reaction(s): Nausea and/or vomiting     Current Facility-Administered Medications on File Prior to Encounter   Medication Dose Route Frequency Provider Last Rate Last Admin    [COMPLETED] diphenhydrAMINE (BENADRYL) injection 50 mg  50 mg Intramuscular Once PRN Hector Guzmán MD   50 mg at 24 0405    [COMPLETED] haloperidol lactate (HALDOL) injection 5 mg  5 mg Intramuscular Once PRN Hector Guzmán MD   5 mg at 24 0405    [COMPLETED] LORazepam (ATIVAN) injection 2 mg  2 mg Intramuscular Once PRN Hector Guzmán MD   2 mg at 24 0405    [COMPLETED] tetanus-diphtheria-acellular pertussis (BOOSTRIX) IM injection 0.5 mL  0.5 mL Intramuscular Once Hector Guzmán MD   0.5 mL at 24 0500    [DISCONTINUED] nicotine (NICODERM CQ) 21 mg/24 hr TD 24 hr patch 21 mg  21 mg Transdermal Once Hector Guzmán MD   21 mg at 24 0500     Current Outpatient Medications on File Prior to  Encounter   Medication Sig Dispense Refill    mesalamine (LIALDA) 1.2 g EC tablet Take 1,200 mg by mouth daily with breakfast 2 tablets - once daily      naproxen (NAPROSYN) 500 mg tablet Take 1 tablet (500 mg total) by mouth 2 (two) times a day with meals (Patient not taking: Reported on 5/2/2024) 60 tablet 0    oxyCODONE (OxyCONTIN) 10 mg 12 hr tablet Take 10 mg by mouth every 6 (six) hours (Patient not taking: Reported on 5/2/2024)       Active Ambulatory Problems     Diagnosis Date Noted    Full thickness rotator cuff tear 11/01/2019    Lateral epicondylitis 11/01/2019     Resolved Ambulatory Problems     Diagnosis Date Noted    No Resolved Ambulatory Problems     Past Medical History:   Diagnosis Date    Chronic pain disorder     Hypercholesteremia     Myocardial infarction (HCC) 2016     Past Surgical History:   Procedure Laterality Date    JOINT REPLACEMENT Left 07/2019    hip replacement    MANDIBLE SURGERY       Social History:   Social History     Socioeconomic History    Marital status: /Civil Union     Spouse name: None    Number of children: None    Years of education: None    Highest education level: None   Occupational History    None   Tobacco Use    Smoking status: Every Day     Current packs/day: 2.00     Types: Cigarettes    Smokeless tobacco: Never   Substance and Sexual Activity    Alcohol use: Yes     Alcohol/week: 5.0 standard drinks of alcohol     Types: 5 Standard drinks or equivalent per week     Comment: Pt reports occasional use    Drug use: Yes     Frequency: 7.0 times per week     Types: Other, Marijuana     Comment: Pt denies    Sexual activity: Not Currently     Partners: Female   Other Topics Concern    None   Social History Narrative    None     Social Determinants of Health     Financial Resource Strain: Not on file   Food Insecurity: Not on file   Transportation Needs: Not on file   Physical Activity: Not on file   Stress: Not on file   Social Connections: Not on file  "  Intimate Partner Violence: Not on file   Housing Stability: Not on file     Family History:   Family History   Problem Relation Age of Onset    Lung cancer Mother     Heart attack Father      Review of Systems   Constitutional:  Negative for chills and fever.   HENT:  Negative for ear pain and sore throat.    Eyes:  Negative for pain and visual disturbance.   Respiratory:  Positive for cough. Negative for shortness of breath.    Cardiovascular:  Negative for chest pain and palpitations.   Gastrointestinal:  Negative for abdominal pain, constipation, diarrhea, nausea and vomiting.   Genitourinary:  Negative for dysuria and hematuria.   Musculoskeletal:  Positive for arthralgias.   Skin:  Negative for color change and rash.   Neurological:  Negative for dizziness and headaches.   Psychiatric/Behavioral:  Positive for dysphoric mood and suicidal ideas.    All other systems reviewed and are negative.    Physical Exam   Vitals: Blood pressure 114/59, pulse 62, temperature 98.8 °F (37.1 °C), temperature source Temporal, resp. rate 18, height 5' 9\" (1.753 m), weight 58.5 kg (129 lb 0.6 oz), SpO2 97%.,Body mass index is 19.06 kg/m².  Constitutional: Awake, alert, in no acute distress.  Head: Normocephalic and atraumatic.   Mouth/Throat: Oropharynx is clear and moist.    Eyes: Conjunctivae and EOM are normal.   Neck: Neck supple. No thyromegaly present.   Cardiovascular: Normal rate, regular rhythm and normal heart sounds.    Pulmonary/Chest: Effort normal and breath sounds normal.   Abdominal: Soft. Bowel sounds are normal. There is no tenderness. There is no rebound and no guarding.   Neurological: No focal deficits.   Skin: Skin is warm and dry. No leg edema.     Assessment     Ace Roy is a(n) 53 y.o. male with MDD.    Tobacco abuse.  NRT.  Ulcerative colitis.  Patient is on mesalamine 800 mg 3 times daily (Lialda 2.4 mg daily nonformulary).  DJD/OA.  Tylenol as needed.  Low BMI.  Dietician " consulted.  Emphysema/cough.  Start Symbicort 2 puff twice daily, Mucinex twice daily.   Psych with MDD.  This is being managed by the psych team.      Prognosis: Fair.    Discharge Plan: In progress.    Advanced Directives: I have discussed in detail with the patient the advanced directives. The patient does not have an appointed POA and does not have a living will.  Patient's emergency contact is his wife, Bela Roy, whose number is [phone].  When discussing cardiac and pulmonary resuscitation efforts with the patient, the patient wishes to be full code.    I have spent more than 50 minutes gathering data, doing physical examination, and discussing the advanced directives, which was witnessed by caring staff.    The patient was discussed with Dr. Garcia and he is in agreement with the above note.

## 2024-05-03 NOTE — NURSING NOTE
Pt was visible and social in the milieu. Pt is cooperative with an irritable edge. Pt is hyperverbal and blaming others for his admission. Pt endorsed anxiety and depression but denied SI/HI/AVH. Pt is very negative. Support offered. Pt ambulates the unit independently. Q 7 minute safety checks were maintained.

## 2024-05-03 NOTE — SOCIAL WORK
05/03/24   Team Meeting   Meeting Type Daily Rounds   Team Members Present   Team Members Present Physician;Nurse;;   Physician Team Member HARMAN Domínguez MD, MD, Dr, MD    Nursing Team Member Edna Sharma RN     Becca Box MS [facility] LPC    Social Work Team Member Patricia Elliott MSW   OT Team Member                        Coretta Velasco CTRS   Patient/Family Present   Patient Present No   New admit, 302, agreeable to sign 201 SA cut wrists, denies si/hi, irritable, disheveled, grandiose

## 2024-05-03 NOTE — NUTRITION
05/03/24 1635   Biochemical Data,Medical Tests, and Procedures   Biochemical Data/Medical Tests/Procedures Lab values reviewed;Meds reviewed   Labs (Comment) 5/2 CMP & CBC WNL. 5/3 CHOL:245, LDL:179, Vit D: 14.5   Meds (Comment) cogentin, atarax, ativan, zyprexa, desyrel   Nutrition-Focused Physical Exam   Nutrition-Focused Physical Exam Findings RN skin assessment reviewed  (No pressure areas noted.)   Nutrition-Focused Physical Exam Findings Smoker. Reports occasional issues with ulcerative colitis. Physical s/s of malnutrition.   Medical-Related Concerns Chronic pain disorder     Hypercholesteremia     Myocardial infarction (HCC)   Adequacy of Intake   Nutrition Modality PO   Feeding Route   PO Independent   Current PO Intake   Current Diet Order Regular diet thin liquids   Current Meal Intake 50-75%;%   Estimated calorie intake compared to estimated need Anticipate nutrient needs will be met.   PES Statement   Problem Clinical   Weight (3) Unintended weight loss NC-3.2   Related to Inadequate intake   As evidenced by: Per patient/family interview;Weight loss   Recommendations/Interventions   Malnutrition/BMI Present Yes   Adult Malnutrition type Chronic illness   Adult Degree of Malnutrition Malnutrition of moderate degree   Malnutrition Characteristics Fat loss;Muscle loss;Inadequate energy   360 Statement Malnutrition related to inadequate energy intake as evidenced by sunken orbitals, temporal wasting, moderates subcutaneous fat loss orbital/cheek region and extremities, <75% energy intake compared to estimated needs >1 month.   Summary Weight change, 14-23#, poor PO; MST-3. Consult: nutrition assessment. Regular diet thin liquids. Meal completions . No diet plan. Reports appetite is alright. Resides with wife. Patient prepares the meals. Reports the amount of meals  he consumes each day depends how busy he is. 5/2/#; no recent weight history. 1/7/22 170#, 41#(24%) loss in >2 years. No  pressure areas noted. Smoker. Reports occasional issues with ulcerative colitis. Physical s/s of malnutrition. Skin integrity reviewed. Recommend add ensure plus high protein once daily.   Interventions/Recommendations Continue current diet order;Supplement initiate   Intervention Comments add ensure plus high protein once daily   Education Assessment   Education Patient declined nutrition education   Patient Nutrition Goals   Goal Avoid weight loss;Meet PO needs   Goal Status Initiated   Timeframe to complete goal by next f/u   Nutrition Complexity Risk   Nutrition complexity level Moderate risk   Follow up date 05/13/24

## 2024-05-03 NOTE — PROGRESS NOTES
05/03/24 1226   Referral Data   Referral Reason Psych   Readmission Root Cause   30 Day Readmission No   Patient Information   Mental Status Alert   Primary Caregiver Self   Buddhist/Cultural Requests denies   Legal Information   Tx Plan Signed Yes   Current Status: 201   Legal Issues denies   Health Care Proxy Appointed No   Activities of Daily Living Prior to Admission   Functional Status Independent   Assistive Device No device needed   Living Arrangement Homeless   Ambulation Independent   Access to Firearms   Access to Firearms No   Income Information   Income Source SSI/SSD   Means of Transportation   Means of Transport to Appts: Drives Self

## 2024-05-03 NOTE — H&P
Psychiatric Evaluation - Behavioral Health   Ace Roy 53 y.o. male MRN: [id number]  Unit/Bed#: [facility] 200-02 Encounter: [id number]    Assessment/Plan   Principal Problem:    Bipolar disorder, current episode mixed, severe (HCC)    Plan:   -Admit to Inpatient Psychiatry  -Milieu Therapy, Group Therapy, Medication Management  -Q7m Safety Checks  -Start Olanzapine 5mg Nightly  Current Facility-Administered Medications   Medication Dose Route Frequency Provider Last Rate    acetaminophen  650 mg Oral Q4H PRN Raad Claritza-Anom, CRNP      acetaminophen  650 mg Oral Q4H PRN Raad Claritza-Anom, CRNP      acetaminophen  975 mg Oral Q6H PRN Raad Claritza-Anom, CRNP      aluminum-magnesium hydroxide-simethicone  30 mL Oral Q4H PRN Keerthi Mohan PA-C      benztropine  0.5 mg Oral Q4H PRN Max 6/day Raad Claritza-Anom, CRNP      hydrOXYzine HCL  25 mg Oral Q6H PRN Max 4/day Raad Claritza-Anom, CRNP      LORazepam  1 mg Intramuscular Q6H PRN Max 3/day Raad Claritza-Anom, CRNP      LORazepam  0.5 mg Oral Q6H PRN Max 4/day Raad Claritza-Anom, CRNP      LORazepam  1 mg Oral Q6H PRN Max 3/day Raad Claritza-Anom, CRNP      mesalamine  800 mg Oral TID Keerthi Mohan PA-C      nicotine  1 patch Transdermal Daily Raad Claritza-Anom, CRNP      OLANZapine  5 mg Intramuscular Q3H PRN Max 3/day Raad Claritza-Anom, CRNP      OLANZapine  2.5 mg Oral Q4H PRN Max 6/day Raad Claritza-Anom, CRNP      OLANZapine  5 mg Oral Q4H PRN Max 3/day Raad Claritza-Anom, CRNP      OLANZapine  5 mg Oral Q3H PRN Max 3/day Raad Claritza-Anom, CRNP      OLANZapine  5 mg Oral HS Colt Domínguez MD      polyethylene glycol  17 g Oral Daily PRN Keerthi Mohan PA-C      traZODone  50 mg Oral HS PRN Raad Claritza-Anom, CRNP       Risks, benefits and possible side effects of Medications:   Risks, benefits, and possible side effects of medications explained to patient and patient verbalizes understanding.         Chief Complaint: I wanted to kill myself  "because I can't see my wife    History of Present Illness     Patient is a 53 y.o. male presents with Signs of suicidal potential and Severe agitation.  Patient was admitted to psychiatric unit on a involuntarily 302 commitment basis, later signed a 201.    Primary complaints include: agitation, depression worse, difficulty sleeping, and feeling suicidal.  Onset of symptoms was abrupt starting 7 days ago with rapidly worsening course since that time. Psychosocial Stressors: family, marital, and social.    Per ED Note by Dr. Guzmán on 5/2/24:  53 y.o. male presenting with suicide attempt.  Patient was brought in by police under 302, apparently he called the ICU where his wife is currently admitted and told them to give her a message that he was going to kill himself.  Staff at that ICU called police who brought the patient in.  Patient was found with a knife cutting at his wrist, brought to the ER.  Patient states that if the knife was sharper he would be dead he also states that if his son had not taken away his guns he would be dead and that he would like to shoot himself.  Patient is requesting that he be discharged so that he can kill himself.  He states that he can no longer see his wife of 40 years, states that he is losing his house and does not have any money, states he has nothing left to live for him and the only thing to do is to kill himself and wipe the slate clean \".  He denies fever, trauma, numbness, weakness, other symptoms.  Patient states he has no idea when his last tetanus shot was, states he smokes 2 packs a day.     Per Crisis worker Christen Branch on 5/2/24  Pt presents to the ED from home accompanied by police who petitioned 302 alleging, \"I was called by an ICU nurse that Ace Roy wanted to kill himself because he couldn't learn about his wife's condition. When I arrived he told me he wanted to kill himself and showed me his wrist that he had cut.\" CW read and reviewed 302 w/pt. CW read " "pt 302 rights. Pt confirms having attempted to commit suicide. Pt confirms having SA in the past via shooting self. Pt does not answer if pt has HI's. Pt appears irritable and does not engage in extensive conversation. Pt provides little details and insight. Pt confirms having SI's. Pt reports sleep and appetite are not so good. Pt does not report having a formal MH dx. Pt confirms having called for help. Pt resides alone at this time and confirms \"wife is in the ICU and almost .\" Pt denies AVH's. Pt reports smoking aprox 2 packs of cigarettes daily, denies use of illegal substances and endorses using occiasional ETOH. Pt denies any legal issues and states, \"stomach problems\" for medical conditions. Pt is calm, irritable, maintains poor eye contact. Pt keeps eyes closed during assessment. Pt states, \"I will only go local and if I can't stay close than I'm going home.\" CW explained to pt, CW could not guarantee placement at a hospital which is local, specifically [facility], as pt was requesting. Pt stated, \"Then I'm not going to any hospital.\" CW attempted to explain to pt the seriousness of the events which took place and the recommendation of the ED doctor for IP tx. Pt states, \"I don't care I'm not going.\" CW advised pt, ED doctor will uphold the 302. Pt refused to engage in further conversation and remained silent.      Dr. Guzmán upheld the 302.     CW sent clinicals to [facility]    On interview, patient states he was upset that he has not been able to see his wife in the hospital as he was banned by the hospital and by his children. Patient states that he rushed his wife to the hospital last week due to sudden and persistent fatigue. States that it got to the point where his wife had to be placed on a ventilator. Patient states that he was initially able to be with his wife, however, his children came in and then, suddenly, he was not allowed to see his wife.     Patient states that during this time, there was " "a lot of family conflict. States he's been trying to see his wife because he loves her. States that the hospital would not speak with him or give him information. Endorses knowing that his wife got better but states he does not know why they will not allow him to see her. States that he was upset and started drinking. States he told the hospital, \"If I'm banned, I might as well end it\" after he could not speak with his wife. States he hasn't drank for 20 years. States he started cutting himself and then the police came and brought him to the ED.    Patient endorses history of impulsive activity when family members pass away or are at risk of passing away. Endorses attempted suicide by gun when his father passed away. States he was very close to his father. States that the gun didn't work. Denies going to inpatient psychiatry during that time.     Patient endorsed history in the past of having multiple days without sleep. States it can go on for days, weeks at a time. States if he falls asleep, it'll be for 30 minutes and then back up. States that sometimes, during this time, he may be mumbling to himself. Per staff, family collateral reports patient with rapid speech, not acting like himself during this episode. Patient denies ever seeing mental health providers in the past.     Psychiatric Review Of Systems:  sleep: yes Having trouble sleeping  appetite changes: yes Takes medication for ulcerative colitis  weight changes: yes Lost off weight due to stress  energy/anergy: Average, can be through the roof, can be low  interest/pleasure/anhedonia: A lot, fishing, walking  somatic symptoms: Just stomach  anxiety/panic: yes  aisha: yes as above  guilty/hopeless: yes  self injurious behavior/risky behavior: yes    Historical Information     Past Psychiatric History:   Inpatient Treatment: Denies previous inpatient unit  Outpatient Treatment: Denies  Past Suicide Attempts: Years ago, attempted with a gun. Gun wouldn't " click. Family then came and took my guns.   Past Violent Behavior: Denies  Past Psychiatric Medication Trials:   Denies    Substance Abuse History:  E-Cigarette/Vaping      E-Cigarette/Vaping Substances       Social History       Tobacco History       Smoking Status  Every Day Current Packs/Day  2.0 packs/day Smoking Tobacco Type  Cigarettes   Pack Year History     Packs/Day From To Years    2   0.0      Smokeless Tobacco Use  Never              Alcohol History       Alcohol Use Status  Yes Drinks/Week  5 Standard drinks or equivalent per week Amount  5.0 standard drinks of alcohol/wk Comment  Pt reports occasional use              Drug Use       Drug Use Status  Yes Types  Marijuana, Other Frequency   7 times/week Comment  Pt denies              Sexual Activity       Sexually Active  Not Currently Partners  Female              Activities of Daily Living    Not Asked                 Additional Substance Use Detail       Questions Responses    Problems Due to Past Use of Alcohol? No    Problems Due to Past Use of Substances? No    Substance Use Assessment Substance use within the past 12 months    Alcohol Use Frequency Daily    Cannabis frequency Daily    Comment:  Daily on 5/2/2024     Heroin Frequency Denies use in past 12 months    Alcohol Drink of Choice Don quail 151    Cocaine frequency Never used    Comment:  Never used on 5/2/2024     Crack Cocaine Frequency Denies use in past 12 months    Methamphetamine Frequency Denies use in past 12 months    Narcotic Frequency Denies use in past 12 months    Benzodiazepine Frequency Denies use in past 12 months    Amphetamine frequency Denies use in past 12 months    Barbituate Frequency Denies use use in past 12 months    Last reviewed by Melody Mcdowell RN on 5/2/2024          I have assessed this patient for substance use within the past 12 months    [facility] - A lot of weed. - Had hip surgery in 2019. Self medicate because of that.     Smoke cigarettes - 2 packs a  day    Family Psychiatric History:   3 years ago, one  nephew committed suicide with a handgun.       Social History:  Education: 11th grade  Learning Disabilities:  Denies  Marital history:   Children: 7  Living arrangement, social support: The patient lives in home with wife.  Occupational History: on SSI, previously doing construction work  Functioning Relationships: poor support system.  Other Pertinent History:  Legal History: Denies   History: Denies      Traumatic History:   Abuse: Denies  Other Traumatic Events: Denies    Past Medical History:   Diagnosis Date    Chronic pain disorder     Hypercholesteremia     Myocardial infarction (HCC) 2016       Medical Review Of Systems:  Pertinent items are noted in HPI.    Meds/Allergies   current meds:   Current Facility-Administered Medications   Medication Dose Route Frequency    acetaminophen (TYLENOL) tablet 650 mg  650 mg Oral Q4H PRN    acetaminophen (TYLENOL) tablet 650 mg  650 mg Oral Q4H PRN    acetaminophen (TYLENOL) tablet 975 mg  975 mg Oral Q6H PRN    aluminum-magnesium hydroxide-simethicone (MAALOX) oral suspension 30 mL  30 mL Oral Q4H PRN    benztropine (COGENTIN) tablet 0.5 mg  0.5 mg Oral Q4H PRN Max 6/day    hydrOXYzine HCL (ATARAX) tablet 25 mg  25 mg Oral Q6H PRN Max 4/day    LORazepam (ATIVAN) injection 1 mg  1 mg Intramuscular Q6H PRN Max 3/day    LORazepam (ATIVAN) tablet 0.5 mg  0.5 mg Oral Q6H PRN Max 4/day    LORazepam (ATIVAN) tablet 1 mg  1 mg Oral Q6H PRN Max 3/day    mesalamine (DELZICOL) delayed release capsule 800 mg  800 mg Oral TID    nicotine (NICODERM CQ) 21 mg/24 hr TD 24 hr patch 1 patch  1 patch Transdermal Daily    OLANZapine (ZyPREXA) IM injection 5 mg  5 mg Intramuscular Q3H PRN Max 3/day    OLANZapine (ZyPREXA) tablet 2.5 mg  2.5 mg Oral Q4H PRN Max 6/day    OLANZapine (ZyPREXA) tablet 5 mg  5 mg Oral Q4H PRN Max 3/day    OLANZapine (ZyPREXA) tablet 5 mg  5 mg Oral Q3H PRN Max 3/day    OLANZapine (ZyPREXA)  tablet 5 mg  5 mg Oral HS    polyethylene glycol (MIRALAX) packet 17 g  17 g Oral Daily PRN    traZODone (DESYREL) tablet 50 mg  50 mg Oral HS PRN     Allergies   Allergen Reactions    Asa [Aspirin] Shortness Of Breath    Penicillins Hives    Tegretol [Carbamazepine] Dizziness and Confusion    Tramadol Vomiting     Other reaction(s): Nausea and/or vomiting       Objective   Vital signs in last 24 hours:  Temp:  [97.8 °F (36.6 °C)-98.8 °F (37.1 °C)] 98.7 °F (37.1 °C)  HR:  [55-83] 55  Resp:  [18] 18  BP: (109-123)/(56-67) 123/56    No intake or output data in the 24 hours ending 05/03/24 1109    Mental Status Evaluation:  Appearance:  disheveled   Behavior:  Cooperative   Speech:  normal volume, normal pace   Mood:  irritable and labile   Affect:  labile and mood-congruent   Language: Adequate for interview   Thought Process:  circumstantial and loose associations   Associations: circumstantial associations, tangential associations, flight of ideas   Thought Content:  normal   Perceptual Disturbances: None   Risk Potential: Suicidal Ideations none  Homicidal Ideations none  Potential for Aggression Yes Per EMR   Sensorium:  person, place, time/date, and situation   Memory:  recent and remote memory grossly intact   Consciousness:  alert and awake    Attention: attention span and concentration were age appropriate   Intellect: Adequate for interview   Fund of Knowledge: Adequate for itnerview   Insight:  limited   Judgment: limited   Muscle Strength:  Muscle Tone: Not formally assessed  Not formally assessed   Gait/Station: normal gait/station   Motor Activity: no abnormal movements     Lab Results: I have personally reviewed all pertinent laboratory/tests results. Most Recent Labs:   Lab Results   Component Value Date    WBC 7.74 05/02/2024    RBC 4.68 05/02/2024    HGB 15.1 05/02/2024    HCT 43.4 05/02/2024     05/02/2024    RDW 12.5 05/02/2024    NEUTROABS 4.32 05/02/2024    SODIUM 135 05/02/2024    K 3.8  05/02/2024     05/02/2024    CO2 23 05/02/2024    BUN 13 05/02/2024    CREATININE 0.87 05/02/2024    GLUC 100 05/02/2024    CALCIUM 9.3 05/02/2024    AST 25 05/02/2024    ALT 12 05/02/2024    ALKPHOS 51 05/02/2024    TP 7.4 05/02/2024    ALB 4.6 05/02/2024    TBILI 0.58 05/02/2024    CHOLESTEROL 245 (H) 05/03/2024    HDL 42 05/03/2024    TRIG 119 05/03/2024    LDLCALC 179 (H) 05/03/2024    [id number] 2.660 05/02/2024      Imaging Studies: N/A  EKG, Pathology, and Other Studies:  on 5/3/24    Code Status: Level 1 - Full Code  Advance Directive and Living Will:      Power of :    POLST:      Patient Strengths/Assets: capable of independent living, cooperative, good physical health    Patient Barriers/Limitations: difficulty adapting, marital/family conflict

## 2024-05-03 NOTE — PROGRESS NOTES
05/03/24 1145   Team Meeting   Meeting Type Tx Team Meeting   Team Members Present   Team Members Present Physician;;Nurse   Physician Team Member Dr Sang MD   Nursing Team Member Edna Sharma RN   Social Work Team Member TANNER Bond   Patient/Family Present   Patient Present Yes   Patient's Family Present No   Tx team reviewed pt strengths,limitations,tx goals and plan.  All in agreement and signed.

## 2024-05-03 NOTE — PLAN OF CARE
Problem: Risk for Self Injury/Neglect  Goal: Attend and participate in unit activities, including therapeutic, recreational, and educational groups  Description: Interventions:  - Provide therapeutic and educational activities daily, encourage attendance and participation, and document same in the medical record  - Obtain collateral information, encourage visitation and family involvement in care   Outcome: Progressing     Problem: Depression  Goal: Attend and participate in unit activities, including therapeutic, recreational, and educational groups  Description: Interventions:  - Provide therapeutic and educational activities daily, encourage attendance and participation, and document same in the medical record   Outcome: Progressing     Problem: Ineffective Coping  Goal: Participates in unit activities  Description: Interventions:  - Provide therapeutic environment   - Provide required programming   - Redirect inappropriate behaviors   Outcome: Progressing

## 2024-05-03 NOTE — PLAN OF CARE
Problem: Risk for Self Injury/Neglect  Goal: Refrain from harming self  Description: Interventions:  - Monitor patient closely, per order  - Develop a trusting relationship  - Supervise medication ingestion, monitor effects and side effects   Outcome: Progressing     Problem: Anxiety  Goal: Anxiety is at manageable level  Description: Interventions:  - Assess and monitor patient's anxiety level.   - Monitor for signs and symptoms (heart palpitations, chest pain, shortness of breath, headaches, nausea, feeling jumpy, restlessness, irritable, apprehensive).   - Collaborate with interdisciplinary team and initiate plan and interventions as ordered.  - [place] patient to unit/surroundings  - Explain treatment plan  - Encourage participation in care  - Encourage verbalization of concerns/fears  - Identify coping mechanisms  - Assist in developing anxiety-reducing skills  - Administer/offer alternative therapies  - Limit or eliminate stimulants  Outcome: Progressing     Problem: Ineffective Coping  Goal: Patient/Family verbalizes awareness of resources  Outcome: Progressing     Problem: Ineffective Coping  Goal: Free from restraint events  Description: - Utilize least restrictive measures   - Provide behavioral interventions   - Redirect inappropriate behaviors   Outcome: Progressing     Problem: Nutrition/Hydration-ADULT  Goal: Nutrient/Hydration intake appropriate for improving, restoring or maintaining nutritional needs  Description: Monitor and assess patient's nutrition/hydration status for malnutrition. Collaborate with interdisciplinary team and initiate plan and interventions as ordered.  Monitor patient's weight and dietary intake as ordered or per policy. Utilize nutrition screening tool and intervene as necessary. Determine patient's food preferences and provide high-protein, high-caloric foods as appropriate.     INTERVENTIONS:  - Monitor oral intake, urinary output, labs, and treatment plans  - Assess  nutrition and hydration status and recommend course of action  - Evaluate amount of meals eaten  - Assist patient with eating if necessary   - Allow adequate time for meals  - Recommend/ encourage appropriate diets, oral nutritional supplements, and vitamin/mineral supplements  - Order, calculate, and assess calorie counts as needed  - Recommend, monitor, and adjust tube feedings and TPN/PPN based on assessed needs  - Assess need for intravenous fluids  - Provide specific nutrition/hydration education as appropriate  - Include patient/family/caregiver in decisions related to nutrition  Outcome: Progressing

## 2024-05-03 NOTE — NURSING NOTE
Out in community for short periods. Positive for evening snack. Requested and received Trazodone 50 mg for complaint of insomnia.  Effective for sleep at present.  Rated depression  and anxiety as moderate .  Appetite is fair.  No suicidal or homicidal ideations. Pleasant during assessment.  Safety maintained via clutter-free environment, ID band, and given non-skid socks.  No change in medical condition.  Medication education given.  Care Plan reviewed and amended. Maintained on q 7 minute checks. Will continue to monitor.

## 2024-05-03 NOTE — PROGRESS NOTES
05/03/24 1230   Housing Stability   In the last 12 months, was there a time when you were not able to pay the mortgage or rent on time? Y   In the last 12 months, how many places have you lived? 1   In the last 12 months, was there a time when you did not have a steady place to sleep or slept in a shelter (including now)? Y   Transportation Needs   In the past 12 months, has lack of transportation kept you from medical appointments or from getting medications? yes   In the past 12 months, has lack of transportation kept you from meetings, work, or from getting things needed for daily living? Yes   Food Insecurity   Within the past 12 months, you worried that your food would run out before you got the money to buy more. Often true   Within the past 12 months, the food you bought just didn't last and you didn't have money to get more. Often true   Intimate Partner Violence   Within the last year, have you been afraid of your partner or ex-partner? No   Within the last year, have you been humiliated or emotionally abused in other ways by your partner or ex-partner? No   Within the last year, have you been kicked, hit, slapped, or otherwise physically hurt by your partner or ex-partner? No   Within the last year, have you been raped or forced to have any kind of sexual activity by your partner or ex-partner? No   Utilities   In the past 12 months has the electric, gas, oil, or water company threatened to shut off services in your home? Yes

## 2024-05-03 NOTE — PROGRESS NOTES
05/03/24 1228   Activity/Group Checklist   Group Admission/Discharge  (self assessment)   Attendance Attended   Attendance Duration (min) 0-15   Interactions Interacted appropriately   Affect/Mood Appropriate   Goals Achieved Identified feelings;Able to listen to others;Able to engage in interactions;Able to reflect/comment on own behavior;Able to manage/cope with feelings;Discussed coping strategies;Able to self-disclose;Able to recieve feedback     Patient agreeable to meet and complete self assessment with [facility].  Patient information from form can be found in media.

## 2024-05-03 NOTE — PROGRESS NOTES
"   05/03/24 1228   Patient Intake   Special Needs none identified   Living Arrangement Homeless   Can patient return home? No   Address to be Discharge to: TBD   Patient's Telephone Number TBD   Access to Firearms No   Work History Disabled   School Grade/Year 12th   Admission Status   Status of Admission 201   Patient History   Presenting Problems sa cut wrists   Treatment History none   Currently in Treatment No   Medical Problems h&p   Legal Issues denies   Substance Abuse Yes (See BH History section for detail)  (pt states he smoke \"alot\" of weed)       "

## 2024-05-03 NOTE — NURSING NOTE
Presents with slight irritable undertone and slightly constricted affect:he endorses moderate depression and anxiety:denies SI,HI,AH,VH.He spends most of his spare time in his room,makes needs known,is compliant with unit rules and medications,appetite is adequate,ambulates independently Discussed was ways to prevent falls.Will continue to educate,monitor,and provide safe,therapeutic milieu.

## 2024-05-03 NOTE — CMS CERTIFICATION NOTE
Recertification: Based upon physical, mental and social evaluations, I certify that inpatient psychiatric services continue to be medically necessary for this patient for a duration of 10 midnights for the treatment of  Bipolar disorder, current episode mixed, severe (HCC) Available alternative community resources still do not meet the patient's mental health care needs. I further attest that an established written individualized plan of care has been updated and is outlined in the patient's medical records.

## 2024-05-03 NOTE — SOCIAL WORK
"Psycho Social    Cm met with pt to obtain the following info.  Pt states he was living with his wife in a camper owned by his nephew. He states he missed one months payment and was evicted from the camper. Pt stated his wife is ill and is uncertain her whereabouts. Pt states his family has turned against him and he is unsure if able to return to his camper. Pt reports his wife is now living with his daughter however he would like to remain with his wife in the camper. Pt states he has no primary care or psych provider.  He states he uses the ER or urgent care when necessary.      Current SI: denies  Current HI:denies  AVH:denies  Depression:10/10  Anxiety:10/10  Strengths:cooperative, sense of humor, music, watches TV if interested, spends time with neighbors    Stressors/Limitations: wife being ill, finances, homeless  Coping skills: being outdoors, working on cars, hunting small game    HX Mental Health: 1st hospitalization for SA, cut wrists, bi-polar   Past Hospitalizations:none  Medication Compliance: pt reports compliance with medication for ulcer in colon    SA/SI in last 12 months:current admission  HI/violence towards others in last 12 months: denies  Access to Firearms: denies, states he sold them to live in a hotel 2 yrs ago   Hx abuse/trauma:denies   Family HX Mental Health: maternal, unsure about paternal   Family HX Suicide/Homicide: nephew committed suicide 3 yrs ago  Family HX Substance Abuse: son struggles with JUANA, brother and nephew smokes weed, sister JUANA  Family HX Dementia: unsure   Substance Abuse:pt smokes \"a lot\" weed for pain of hip   Smoking Cessation: smokes 2 packs a day due to current stressors   Legal Issues: denies   Marital Status: 39 yrs   Sexual Preference:heterosexual male   Children:7 children 1 son and 6 daughters oldest 36 and youngest 18 yrs, pt reports strained relationship  Parents:    Siblings:2 brothers 2 sisters  (diabetic renal heart failure, " "stroke from JUANA) strained relationship   Pets: cat in camper son is supposed to  at camper   Education HX: 12th grade   Type of Work: SSI, does side work, prior to hip injury worked in construction    HX: basic training    Zoroastrian Preference: denies   Cultural needs: none identified on unit   Financial:SSI approx $724 month   POA/guardianship/advanced: directives: denies  Pharmacy: TBD     Housing Stability-Dispo/211: pt lives in a camper that nephew owns and states there is no water, wife lives with daughter  Transportation: pt has drivers license and drives self however car is not registered   Food Insecurity: \"plenty of times\" daughter blocked food stamps   Intimate Partner Violence: denies   Utilities: currently no water/electric    Psychiatrist: agreeable to referral   Therapist: declined   PCP: declined  D&A:declined   Case Management: agreeable to referral   Family contact: wife signed earle Adilia daughter does not have contact info  "

## 2024-05-03 NOTE — NURSING NOTE
Patient appears to have slept (uninterrupted) overnight. No respiratory distress observed while sleeping. No acute behaviors this shift. Maintained on Q 7 min safety checks. Fall safety precautions remain in place.  Fluids at bedside to promote hydration.

## 2024-05-03 NOTE — MALNUTRITION/BMI
This medical record reflects one or more clinical indicators suggestive of malnutrition and/or morbid obesity.    Malnutrition Findings:   Adult Malnutrition type: Chronic illness  Adult Degree of Malnutrition: Malnutrition of moderate degree  Malnutrition Characteristics: Fat loss, Muscle loss, Inadequate energy              360 Statement: Malnutrition related to inadequate energy intake as evidenced by sunken orbitals, temporal wasting, moderates subcutaneous fat loss orbital/cheek region and extremities, <75% energy intake compared to estimated needs >1 month.    Regular diet thin liquids. Ensure plus high protein once daily.     BMI Findings:           Body mass index is 19.06 kg/m².     See Nutrition note dated 5/3/2024 for additional details.  Completed nutrition assessment is viewable in the nutrition documentation.

## 2024-05-03 NOTE — PLAN OF CARE
Problem: Risk for Self Injury/Neglect  Goal: Treatment Goal: Remain safe during length of stay, learn and adopt new coping skills, and be free of self-injurious ideation, impulses and acts at the time of discharge  Outcome: Progressing  Goal: Refrain from harming self  Description: Interventions:  - Monitor patient closely, per order  - Develop a trusting relationship  - Supervise medication ingestion, monitor effects and side effects   Outcome: Progressing  Goal: Recognize maladaptive responses and adopt new coping mechanisms  Outcome: Progressing     Problem: Depression  Goal: Treatment Goal: Demonstrate behavioral control of depressive symptoms, verbalize feelings of improved mood/affect, and adopt new coping skills prior to discharge  Outcome: Progressing  Goal: Refrain from isolation  Description: Interventions:  - Develop a trusting relationship   - Encourage socialization   Outcome: Progressing  Goal: Refrain from self-neglect  Outcome: Progressing     Problem: Anxiety  Goal: Anxiety is at manageable level  Description: Interventions:  - Assess and monitor patient's anxiety level.   - Monitor for signs and symptoms (heart palpitations, chest pain, shortness of breath, headaches, nausea, feeling jumpy, restlessness, irritable, apprehensive).   - Collaborate with interdisciplinary team and initiate plan and interventions as ordered.  - [place] patient to unit/surroundings  - Explain treatment plan  - Encourage participation in care  - Encourage verbalization of concerns/fears  - Identify coping mechanisms  - Assist in developing anxiety-reducing skills  - Administer/offer alternative therapies  - Limit or eliminate stimulants  Outcome: Progressing

## 2024-05-03 NOTE — TREATMENT PLAN
TREATMENT PLAN REVIEW - Behavioral Health Ace Roy 53 y.o. 1970 male MRN: [id number]    [facility] Encounter: [id number]          Admit Date/Time:  5/2/2024  3:52 PM    Treatment Team:   MD Memo Machuca MD Jacqueline F Wagner Paul F Klose, RN Nicole L Saas Loree Smith Pope, LPC Isabelle Kenna Celeste Pawling Emily Medellin, LPN Patricia Solt, RN    Diagnosis: Principal Problem:    Bipolar disorder, current episode mixed, severe (HCC)      Patient Strengths/Assets: capable of independent living    Patient Barriers/Limitations: difficulty adapting, marital/family conflict, poor insight    Short Term Goals: decrease in depressive symptoms, decrease in anxiety symptoms, decrease in suicidal thoughts, ability to stay safe on the unit, ability to stay free of restraints    Long Term Goals: improvement in depression, improvement in anxiety, resolution of manic symptoms, stabilization of mood, free of suicidal thoughts, improved insight    Progress Towards Goals: starting psychiatric medications as prescribed, attends groups, participates in milieu therapy    Recommended Treatment: medication management, patient medication education, group therapy, milieu therapy, continued Behavioral Health psychiatric evaluation/assessment process    Treatment Frequency: daily medication monitoring, group and milieu therapy daily, monitoring through interdisciplinary rounds, monitoring through weekly patient care conferences    Expected Discharge Date:  5/13/24    Discharge Plan: discharge to home    Treatment Plan Created/Updated By: Colt Domínguez MD

## 2024-05-04 PROCEDURE — 99232 SBSQ HOSP IP/OBS MODERATE 35: CPT

## 2024-05-04 RX ORDER — LIDOCAINE 50 MG/G
1 PATCH TOPICAL DAILY
Status: DISCONTINUED | OUTPATIENT
Start: 2024-05-04 | End: 2024-05-08 | Stop reason: HOSPADM

## 2024-05-04 RX ORDER — OLANZAPINE 10 MG/1
10 TABLET ORAL
Status: DISCONTINUED | OUTPATIENT
Start: 2024-05-04 | End: 2024-05-05

## 2024-05-04 RX ORDER — CYANOCOBALAMIN 1000 UG/ML
1000 INJECTION, SOLUTION INTRAMUSCULAR; SUBCUTANEOUS
Status: DISCONTINUED | OUTPATIENT
Start: 2024-05-05 | End: 2024-05-08 | Stop reason: HOSPADM

## 2024-05-04 RX ORDER — TRAZODONE HYDROCHLORIDE 50 MG/1
50 TABLET ORAL
Status: DISCONTINUED | OUTPATIENT
Start: 2024-05-04 | End: 2024-05-08 | Stop reason: HOSPADM

## 2024-05-04 RX ORDER — METHOCARBAMOL 500 MG/1
500 TABLET, FILM COATED ORAL EVERY 6 HOURS PRN
Status: DISCONTINUED | OUTPATIENT
Start: 2024-05-04 | End: 2024-05-08 | Stop reason: HOSPADM

## 2024-05-04 RX ORDER — ATORVASTATIN CALCIUM 10 MG/1
10 TABLET, FILM COATED ORAL
Status: DISCONTINUED | OUTPATIENT
Start: 2024-05-04 | End: 2024-05-08 | Stop reason: HOSPADM

## 2024-05-04 RX ORDER — ERGOCALCIFEROL 1.25 MG/1
50000 CAPSULE ORAL WEEKLY
Status: DISCONTINUED | OUTPATIENT
Start: 2024-05-05 | End: 2024-05-08 | Stop reason: HOSPADM

## 2024-05-04 RX ADMIN — MESALAMINE 800 MG: 400 CAPSULE, DELAYED RELEASE ORAL at 17:04

## 2024-05-04 RX ADMIN — OLANZAPINE 10 MG: 10 TABLET, FILM COATED ORAL at 20:52

## 2024-05-04 RX ADMIN — GUAIFENESIN 600 MG: 600 TABLET ORAL at 08:43

## 2024-05-04 RX ADMIN — ATORVASTATIN CALCIUM 10 MG: 10 TABLET, FILM COATED ORAL at 17:04

## 2024-05-04 RX ADMIN — NICOTINE 1 PATCH: 21 PATCH, EXTENDED RELEASE TRANSDERMAL at 08:44

## 2024-05-04 RX ADMIN — TRAZODONE HYDROCHLORIDE 50 MG: 50 TABLET ORAL at 20:51

## 2024-05-04 RX ADMIN — BUDESONIDE AND FORMOTEROL FUMARATE DIHYDRATE 2 PUFF: 80; 4.5 AEROSOL RESPIRATORY (INHALATION) at 08:44

## 2024-05-04 RX ADMIN — GUAIFENESIN 600 MG: 600 TABLET ORAL at 20:51

## 2024-05-04 RX ADMIN — LIDOCAINE 1 PATCH: 50 PATCH CUTANEOUS at 11:43

## 2024-05-04 RX ADMIN — BUDESONIDE AND FORMOTEROL FUMARATE DIHYDRATE 2 PUFF: 80; 4.5 AEROSOL RESPIRATORY (INHALATION) at 17:05

## 2024-05-04 RX ADMIN — MESALAMINE 800 MG: 400 CAPSULE, DELAYED RELEASE ORAL at 08:43

## 2024-05-04 RX ADMIN — MESALAMINE 800 MG: 400 CAPSULE, DELAYED RELEASE ORAL at 20:50

## 2024-05-04 NOTE — PROGRESS NOTES
Progress Note - Behavioral Health   Ace Roy 53 y.o. male MRN: [id number]  Unit/Bed#: [facility] 200-02 Encounter: [id number]    Assessment/Plan   Principal Problem:    Bipolar disorder, current episode mixed, severe (HCC)      Behavior over the last 24 hours:  unchanged  Sleep: broken  Appetite: normal  Medication side effects: No  ROS:  complaints of left hip pain  and all other systems are negative    Subjective: Ace was seen today for psychiatric follow-up.  On assessment patient is visible on the unit, social with peers.  Patient endorses anxiety and racing thoughts, however, patient unable to give a reason why.  He is hyper verbal on the unit.  His mood is controlled with no recent behavioral outburst.  According to nursing staff report he is compliant with his current medication regimen.  He denied any SI/HI/AVH.  He did not appear internally preoccupied.    Mental Status Evaluation:  Appearance:  age appropriate, bearded, and disheveled   Behavior:  cooperative   Speech:  hyper verbal   Mood:  anxious   Affect:  mood-congruent   Thought Process:  circumstantial   Associations: circumstantial associations   Thought Content:  No overt delusions   Perceptual Disturbances: Denied AVH, did not appear internally preoccupied   Risk Potential: Suicidal Ideations none at present  Homicidal Ideations none at present  Potential for Aggression No   Sensorium:  person, place, and time/date   Memory:  recent and remote memory grossly intact   Consciousness:  alert and awake    Attention: attention span and concentration were age appropriate   Insight:  limited   Judgment: limited   Gait/Station: normal gait/station   Motor Activity: no abnormal movements     Progress Toward Goals: Unchanged.  Patient is less irritable and labile on the unit.  He remains hyperverbal.  According to nursing staff patient is compliant with his current psychotropic medication regimen.  He denies side effects on current psychotropic  medication regimen.  Will continue current psychotropic medication regimen.  No discharge date at this time.    Patient initiated on trazodone 50 mg p.o. daily at bedtime for insomnia.  Zyprexa increased to 10 mg p.o. daily at bedtime for psychosis    Recommended Treatment: Continue with group therapy, milieu therapy and occupational therapy.      Risks, benefits and possible side effects of Medications:   Risks, benefits, and possible side effects of medications explained to patient and patient verbalizes understanding.      Medications: all current active meds have been reviewed.    Labs: I have personally reviewed all pertinent laboratory/tests results. Most Recent Labs:   Lab Results   Component Value Date    WBC 7.74 05/02/2024    RBC 4.68 05/02/2024    HGB 15.1 05/02/2024    HCT 43.4 05/02/2024     05/02/2024    RDW 12.5 05/02/2024    NEUTROABS 4.32 05/02/2024    SODIUM 135 05/02/2024    K 3.8 05/02/2024     05/02/2024    CO2 23 05/02/2024    BUN 13 05/02/2024    CREATININE 0.87 05/02/2024    GLUC 100 05/02/2024    CALCIUM 9.3 05/02/2024    AST 25 05/02/2024    ALT 12 05/02/2024    ALKPHOS 51 05/02/2024    TP 7.4 05/02/2024    ALB 4.6 05/02/2024    TBILI 0.58 05/02/2024    CHOLESTEROL 245 (H) 05/03/2024    HDL 42 05/03/2024    TRIG 119 05/03/2024    LDLCALC 179 (H) 05/03/2024    [id number] 2.660 05/02/2024       Counseling / Coordination of Care  Total floor / unit time spent today 25 minutes.

## 2024-05-04 NOTE — PROGRESS NOTES
"Progress Note - Ace Roy 53 y.o. male MRN: [id number]    Unit/Bed#: -02 Encounter: [id number]        Subjective:   Patient seen and examined at bedside after reviewing the chart and discussing the case with the caring staff.      Patient examined at bedside.  Patient complaining of pain to left hip.  Tried Tylenol without relief.  He otherwise denies any acute symptoms.    Physical Exam   Vitals: Blood pressure 105/59, pulse 62, temperature 98.1 °F (36.7 °C), temperature source Temporal, resp. rate 18, height 5' 9\" (1.753 m), weight 58.5 kg (129 lb 0.6 oz), SpO2 95%.,Body mass index is 19.06 kg/m².  Constitutional: Patient in no acute distress.  HEENT: PERR, EOMI, MMM.  Cardiovascular: Normal rate and regular rhythm.    Pulmonary/Chest: Effort normal and breath sounds normal.   Abdomen: Soft, + BS, NT.    Assessment/Plan:  Ace Roy is a(n) 53 y.o. male with MDD.     Tobacco abuse.  NRT.  Ulcerative colitis.  Patient is on mesalamine 800 mg 3 times daily (Lialda 2.4 mg daily nonformulary).  DJD/OA/left hip pain.  Tylenol as needed.  Add lidocaine patch to left hip and Robaxin as needed.  Low BMI.  Dietician consulted.  Emphysema/cough.  Started Symbicort 2 puff twice daily and Mucinex twice daily 5/3.  Vitamin D deficiency.  Patient started on vitamin D2 50,000 units weekly for 10 weeks followed by vitamin D3 1000 units daily.  Vitamin B12 deficiency.  Patient started on monthly vitamin B12 injections.  Hyperlipidemia.   mg/dL on 5/2/24.  ASCVD 10.4%.  Start atorvastatin 10 mg daily 5/4.    The patient was discussed with Dr. Garcia and he is in agreement with the above note.  "

## 2024-05-04 NOTE — NURSING NOTE
Pt was visible and social with select peers. Pt was medication compliant. Pt endorses moderate anxiety and depression. Q 7 minute safety checks were maintained. Pt lacks insight into his current living situation. No acute behaviors noted. Q 7 minute safety checks were maintained.

## 2024-05-05 PROCEDURE — 99232 SBSQ HOSP IP/OBS MODERATE 35: CPT

## 2024-05-05 RX ORDER — OLANZAPINE 15 MG/1
15 TABLET ORAL
Status: DISCONTINUED | OUTPATIENT
Start: 2024-05-05 | End: 2024-05-08 | Stop reason: HOSPADM

## 2024-05-05 RX ADMIN — BUDESONIDE AND FORMOTEROL FUMARATE DIHYDRATE 2 PUFF: 80; 4.5 AEROSOL RESPIRATORY (INHALATION) at 09:06

## 2024-05-05 RX ADMIN — HYDROXYZINE HYDROCHLORIDE 25 MG: 25 TABLET ORAL at 09:05

## 2024-05-05 RX ADMIN — METHOCARBAMOL 500 MG: 500 TABLET ORAL at 10:11

## 2024-05-05 RX ADMIN — TRAZODONE HYDROCHLORIDE 50 MG: 50 TABLET ORAL at 21:42

## 2024-05-05 RX ADMIN — GUAIFENESIN 600 MG: 600 TABLET ORAL at 21:42

## 2024-05-05 RX ADMIN — LIDOCAINE 1 PATCH: 50 PATCH CUTANEOUS at 08:58

## 2024-05-05 RX ADMIN — NICOTINE 1 PATCH: 21 PATCH, EXTENDED RELEASE TRANSDERMAL at 08:59

## 2024-05-05 RX ADMIN — BUDESONIDE AND FORMOTEROL FUMARATE DIHYDRATE 2 PUFF: 80; 4.5 AEROSOL RESPIRATORY (INHALATION) at 17:22

## 2024-05-05 RX ADMIN — OLANZAPINE 15 MG: 15 TABLET, FILM COATED ORAL at 21:42

## 2024-05-05 RX ADMIN — MESALAMINE 800 MG: 400 CAPSULE, DELAYED RELEASE ORAL at 21:42

## 2024-05-05 RX ADMIN — ERGOCALCIFEROL 50000 UNITS: 1.25 CAPSULE, LIQUID FILLED ORAL at 08:57

## 2024-05-05 RX ADMIN — ATORVASTATIN CALCIUM 10 MG: 10 TABLET, FILM COATED ORAL at 17:19

## 2024-05-05 RX ADMIN — TRAZODONE HYDROCHLORIDE 50 MG: 50 TABLET ORAL at 02:08

## 2024-05-05 RX ADMIN — MESALAMINE 800 MG: 400 CAPSULE, DELAYED RELEASE ORAL at 08:57

## 2024-05-05 RX ADMIN — MESALAMINE 800 MG: 400 CAPSULE, DELAYED RELEASE ORAL at 17:19

## 2024-05-05 RX ADMIN — GUAIFENESIN 600 MG: 600 TABLET ORAL at 08:57

## 2024-05-05 NOTE — PROGRESS NOTES
"Progress Note - Ace Roy 53 y.o. male MRN: [id number]    Unit/Bed#: -02 Encounter: [id number]        Subjective:   Patient seen and examined at bedside after reviewing the chart and discussing the case with the caring staff.      Patient examined at bedside.  Patient has no acute complaints except for ongoing pain to left hip.  He feels lidocaine patch is providing some relief.    Physical Exam   Vitals: Blood pressure 104/56, pulse 66, temperature 97.6 °F (36.4 °C), temperature source Temporal, resp. rate 18, height 5' 9\" (1.753 m), weight 58.5 kg (129 lb 0.6 oz), SpO2 98%.,Body mass index is 19.06 kg/m².  Constitutional: Patient in no acute distress.  HEENT: PERR, EOMI, MMM.  Cardiovascular: Normal rate and regular rhythm.    Pulmonary/Chest: Effort normal and breath sounds normal.   Abdomen: Soft, + BS, NT.    Assessment/Plan:  Ace Roy is a(n) 53 y.o. male with MDD.     Tobacco abuse.  NRT.  Ulcerative colitis.  Patient is on mesalamine 800 mg 3 times daily (Lialda 2.4 mg daily nonformulary).  DJD/OA/left hip pain.  Tylenol as needed.  Lidocaine patch to left hip and Robaxin as needed.  Low BMI.  Dietician consulted.  Emphysema/cough.  Started Symbicort 2 puff twice daily and Mucinex twice daily 5/3.  Vitamin D deficiency.  Patient started on vitamin D2 50,000 units weekly for 10 weeks followed by vitamin D3 1000 units daily.  Vitamin B12 deficiency.  Patient started on monthly vitamin B12 injections.  Hyperlipidemia.   mg/dL on 5/2/24.  ASCVD 10.4%.  Start atorvastatin 10 mg daily 5/4.    The patient was discussed with Dr. Garcia and he is in agreement with the above note.  "

## 2024-05-05 NOTE — TREATMENT TEAM
05/05/24 0900   Perrin Anxiety Scale   Anxious Mood 2   Tension 2   Fears 2   Insomnia 2   Intellectual 1   Depressed Mood 1   Somatic Complaints: Muscular 1   Somatic Complaints: Sensory 1   Cardiovascular Symptoms 0   Respiratory Symptoms 0   Gastrointestinal Symptoms 0   Genitourinary Symptoms 0   Autonomic Symptoms 1   Behavior at Interview 2   Perrin Anxiety Score 15     Pt stated he had an increase in anxiety due to poor sleep. Pt was medicated with PRN atarax 25 mg for mild anxiety. Will monitor for effectiveness.

## 2024-05-05 NOTE — TREATMENT TEAM
05/05/24 0900   Charting Type   Charting Type Shift assessment   Neurological   Neuro (WDL) WDL   Orientation Level Oriented X4   HEENT   HEENT (WDL) X   Head and Face Symmetrical   Teeth Missing teeth   Respiratory   Respiratory (WDL) WDL   Pain Assessment   Pain Assessment Tool 0-10   Pain Score No Pain   Peripheral Vascular   Peripheral Vascular (WDL) WDL   Integumentary   Integumentary (WDL) WDL   nicotine (NICODERM CQ) 21 mg/24 hr TD 24 hr patch 1 patch   Medication Patch Intact? Patch intact   Location of Patch left arm   lidocaine (LIDODERM) 5 % patch 1 patch   Medication Patch Intact? Patch intact   Location of Patch Left lower back   Nikolay Scale   Sensory Perceptions 4   Moisture 4   Activity 3   Mobility 4   Nutrition 3   Friction and Shear 3   Nikolay Scale Score 21   Musculoskeletal   Musculoskeletal (WDL) WDL   Bedside Mobility Assessment Tool - BMAT   Level 1 - Sit and shake 1   Level 2 - Stretch and Point 1   Level 3 - Stand 1   Level 4 - Walk 1   BMAT Level 4   Gastrointestinal   Gastrointestinal (WDL) X   Abdomen Inspection Flat;Nondistended   Bowel Sounds (All Quadrants) Normoactive   Tenderness Nontender   Last BM Date 05/04/24   Passing Flatus Yes   Genitourinary   Genitourinary (WDL) WDL   Anal/Rectal   Anal/Rectal (WDL) WDL   Psychosocial   Psychosocial (WDL) X   Patient Behaviors/Mood Cooperative   Needs Expressed Emotional   Family Behaviors Unable to assess   Visitor Behaviors Unable to assess   Ability to Express Feelings Able to express   Ability to Express Needs Able to express   Ability to Express Thoughts Able to express   Ability to Understand Others Understands       BLEPS completed  WDL

## 2024-05-05 NOTE — NURSING NOTE
"Pt was medicated with PRN atarax 25 mg for mild anxiety. Pt states the medication, \" helped a whole lot.\"   "

## 2024-05-05 NOTE — PROGRESS NOTES
Progress Note - Behavioral Health   Ace Roy 53 y.o. male MRN: [id number]  Unit/Bed#: [facility] 200-02 Encounter: [id number]    Assessment/Plan   Principal Problem:    Bipolar disorder, current episode mixed, severe (HCC)      Behavior over the last 24 hours:  unchanged  Sleep: broken  Appetite: normal  Medication side effects: No  ROS:  complaints of left hip pain  and all other systems are negative    Subjective: Ace was seen today for psychiatric follow-up.  On assessment patient calm, cooperative.  Patient endorses depression anxiety related to the unknown status of his wife's admission to the ICU.  He is hyperverbal, rambling.  Patient is  ruminative in thought.  According to nursing staff report he is compliant with his current medication regimen and continues to exhibit a controlled mood on the unit.  He denied any SI/HI/AVH.  He did not appear internally preoccupied.    Mental Status Evaluation:  Appearance:  age appropriate, bearded, and disheveled   Behavior:  cooperative   Speech:  hyper verbal   Mood:  anxious   Affect:  mood-congruent   Thought Process:  circumstantial   Associations: circumstantial associations   Thought Content:  No overt delusions   Perceptual Disturbances: Denied AVH, did not appear internally preoccupied   Risk Potential: Suicidal Ideations none at present  Homicidal Ideations none at present  Potential for Aggression No   Sensorium:  person, place, and time/date   Memory:  recent and remote memory grossly intact   Consciousness:  alert and awake    Attention: attention span and concentration were age appropriate   Insight:  limited   Judgment: limited   Gait/Station: Seated in a chair   Motor Activity: no abnormal movements     Progress Toward Goals: Unchanged.  Patient remains calm, cooperative.  He is visible on the unit, ruminative and hyperverbal.  He is compliant with his current psychotropic medication regimen.  He denies side effects on current psychotropic medication  regimen.  Will continue current psychotropic medication regimen.    Recommended Treatment: Continue with group therapy, milieu therapy and occupational therapy.      Risks, benefits and possible side effects of Medications:   Risks, benefits, and possible side effects of medications explained to patient and patient verbalizes understanding.      Medications: all current active meds have been reviewed.    Labs: I have personally reviewed all pertinent laboratory/tests results. Most Recent Labs:   Lab Results   Component Value Date    WBC 7.74 05/02/2024    RBC 4.68 05/02/2024    HGB 15.1 05/02/2024    HCT 43.4 05/02/2024     05/02/2024    RDW 12.5 05/02/2024    NEUTROABS 4.32 05/02/2024    SODIUM 135 05/02/2024    K 3.8 05/02/2024     05/02/2024    CO2 23 05/02/2024    BUN 13 05/02/2024    CREATININE 0.87 05/02/2024    GLUC 100 05/02/2024    CALCIUM 9.3 05/02/2024    AST 25 05/02/2024    ALT 12 05/02/2024    ALKPHOS 51 05/02/2024    TP 7.4 05/02/2024    ALB 4.6 05/02/2024    TBILI 0.58 05/02/2024    CHOLESTEROL 245 (H) 05/03/2024    HDL 42 05/03/2024    TRIG 119 05/03/2024    LDLCALC 179 (H) 05/03/2024    [id number] 2.660 05/02/2024       Counseling / Coordination of Care  Total floor / unit time spent today 25 minutes.

## 2024-05-05 NOTE — NURSING NOTE
Detail Level: Simple Patient had some difficulty falling asleep and staying asleep. No signs or symptoms of distress. Q 7 minute safety checks maintained.   Negative Screening Text: A score of less than 3 is considered a negative PEST score. Has A Doctor Ever Told You That You Have Arthritis?: Yes Have You Ever Had A Swollen Joint?: No Positive Screening Text: A score of 3 or greater is considered a positive PEST score.

## 2024-05-05 NOTE — NURSING NOTE
Patient visible on the unit. Social with peers. Endorses moderate anxiety and depression. Blames his son for his problems. Denies SI, HI, A/V/H. Participated in snack. Compliant with medications. Continuous rounding maintained.

## 2024-05-05 NOTE — NURSING NOTE
Pt was visible and social in the milieu. Pt endorses depression and anxiety. Pt states he is not sleeping well. Support offered. Pt denies SI/HI/AVH. Pt is concerned about his wife and doesn't know how to get in touch with her. Pt is hyperverbal and rambles. Pt was medicated with PRN atarax with a positive effect. BLEPS completed. Q 7 minute safety checks were maintained.

## 2024-05-06 ENCOUNTER — TELEPHONE (OUTPATIENT)
Dept: PSYCHIATRY | Facility: CLINIC | Age: 54
End: 2024-05-06

## 2024-05-06 PROCEDURE — 99232 SBSQ HOSP IP/OBS MODERATE 35: CPT | Performed by: STUDENT IN AN ORGANIZED HEALTH CARE EDUCATION/TRAINING PROGRAM

## 2024-05-06 RX ADMIN — MESALAMINE 800 MG: 400 CAPSULE, DELAYED RELEASE ORAL at 17:12

## 2024-05-06 RX ADMIN — NICOTINE 1 PATCH: 21 PATCH, EXTENDED RELEASE TRANSDERMAL at 08:16

## 2024-05-06 RX ADMIN — BUDESONIDE AND FORMOTEROL FUMARATE DIHYDRATE 2 PUFF: 80; 4.5 AEROSOL RESPIRATORY (INHALATION) at 08:21

## 2024-05-06 RX ADMIN — OLANZAPINE 15 MG: 15 TABLET, FILM COATED ORAL at 21:14

## 2024-05-06 RX ADMIN — GUAIFENESIN 600 MG: 600 TABLET ORAL at 08:16

## 2024-05-06 RX ADMIN — MESALAMINE 800 MG: 400 CAPSULE, DELAYED RELEASE ORAL at 21:14

## 2024-05-06 RX ADMIN — GUAIFENESIN 600 MG: 600 TABLET ORAL at 21:14

## 2024-05-06 RX ADMIN — ATORVASTATIN CALCIUM 10 MG: 10 TABLET, FILM COATED ORAL at 17:12

## 2024-05-06 RX ADMIN — MESALAMINE 800 MG: 400 CAPSULE, DELAYED RELEASE ORAL at 08:16

## 2024-05-06 RX ADMIN — LIDOCAINE 1 PATCH: 50 PATCH CUTANEOUS at 08:18

## 2024-05-06 RX ADMIN — TRAZODONE HYDROCHLORIDE 50 MG: 50 TABLET ORAL at 21:14

## 2024-05-06 RX ADMIN — BUDESONIDE AND FORMOTEROL FUMARATE DIHYDRATE 2 PUFF: 80; 4.5 AEROSOL RESPIRATORY (INHALATION) at 17:24

## 2024-05-06 NOTE — NURSING NOTE
Patient is visible in milieu with peers, medication compliant and cooperative. Patient endorses moderate anxiety and no depression. Denies SI/HI and hallucinations. Patient is pleasant on approach and talkative. Continued care and 7 minute safety checks in progress.

## 2024-05-06 NOTE — PLAN OF CARE
Problem: Risk for Self Injury/Neglect  Goal: Attend and participate in unit activities, including therapeutic, recreational, and educational groups  Description: Interventions:  - Provide therapeutic and educational activities daily, encourage attendance and participation, and document same in the medical record  - Obtain collateral information, encourage visitation and family involvement in care   Outcome: Progressing     Problem: Ineffective Coping  Goal: Participates in unit activities  Description: Interventions:  - Provide therapeutic environment   - Provide required programming   - Redirect inappropriate behaviors   Outcome: Progressing   Patient out with peers socializing; patient joins groups with interactions.

## 2024-05-06 NOTE — TELEPHONE ENCOUNTER
Work que referral for med management appt as hospital discharge for pt.  Pt scheduled with Dr Yepez for 5/16/2024 @ 3:00pm.

## 2024-05-06 NOTE — NURSING NOTE
Patient visible on the unit. Pleasant and cooperative. Social with peers. Denies SI, HI, hallucinations, and depression. Endorses mild anxiety. Compliant with medications. Able to make needs known. Q 7 minute checks maintained. Will continue to monitor and access.

## 2024-05-06 NOTE — PROGRESS NOTES
"Progress Note - Ace Roy 53 y.o. male MRN: [id number]    Unit/Bed#: -02 Encounter: [id number]        Subjective:   Patient seen and examined at bedside after reviewing the chart and discussing the case with the caring staff.      Patient examined at bedside.  Patient has no acute complaints except for ongoing pain to left hip.     Physical Exam   Vitals: Blood pressure 109/57, pulse 72, temperature 97.8 °F (36.6 °C), temperature source Temporal, resp. rate 18, height 5' 9\" (1.753 m), weight 58.5 kg (129 lb 0.6 oz), SpO2 98%.,Body mass index is 19.06 kg/m².  Constitutional: Patient in no acute distress.  HEENT: PERR, EOMI, MMM.  Cardiovascular: Normal rate and regular rhythm.    Pulmonary/Chest: Effort normal and breath sounds normal.   Abdomen: Soft, + BS, NT.    Assessment/Plan:  Ace Roy is a(n) 53 y.o. male with MDD.     Tobacco abuse.  NRT.  Ulcerative colitis.  Patient is on mesalamine 800 mg 3 times daily (Lialda 2.4 mg daily nonformulary).  DJD/OA/left hip pain.  Tylenol as needed.  Lidocaine patch to left hip and Robaxin as needed.  Low BMI.  Dietician consulted.  Emphysema/cough.  Started Symbicort 2 puff twice daily and Mucinex twice daily 5/3.  Vitamin D deficiency.  Patient started on vitamin D2 50,000 units weekly for 10 weeks followed by vitamin D3 1000 units daily.  Vitamin B12 deficiency.  Patient started on monthly vitamin B12 injections.  Hyperlipidemia.   mg/dL on 5/2/24.  ASCVD 10.4%.  Start atorvastatin 10 mg daily 5/4.  "

## 2024-05-06 NOTE — SOCIAL WORK
05/06/24   Team Meeting   Meeting Type Daily Rounds   Team Members Present   Team Members Present Physician;Nurse;;   Physician Team Member HARMAN Arias MD, MD    Nursing Team Member Edna Sharma RN     Becca Box MS NCC LPC    Social Work Team Member Patricia Elliott MSW   OT Team Member                        Coretta FRANZS   Patient/Family Present   Patient Present No   D/c dispo tbd, mild anx, Irritable edge, poor sleep, denies si/hi, pleasant, verify housing

## 2024-05-06 NOTE — DISCHARGE INSTR - OTHER ORDERS
You are being discharged to your home at [address] [facility] [address] TELEPHONE [phone]     Triggers you have identified during your hospitalization that led to your admission include ineffective coping skills and regression in mental health. Coping skills you have identified during your hospitalization include working on cars and being outdoor. If you are unable to deal with your distressed mood alone please contact your provider Edna Cox [phone] . If that is not effective and you continue to have (ex: suicidal ideation, homicidal ideation, distressed mood, overwhelmed, in crisis) please contact Crisis by dialing 011, or call [facility] 1 [phone], dial 911 or go to the nearest emergency center.      *[facility] Crisis Hotline: 1 101.420.4205  *National Suicide Prevention Lifeline:  1-988.679.5463  *Alcohol Anonymous: 779.240.5667  *[place] Drug & Alcohol Commission: (669) 702-6780  *National [place] on Mental Illness (VENKATA) HELPLINE: 759.193.5088/Website: www.venkata.org  *Substance Abuse and Mental Health Services Administration([facility]) National Helpline, which is a confidential, free, 24-hour-a-day, 365-day-a-year, information service for individuals and family members facing mental health and/or substance use disorders. This service provides referrals to local treatment facilities, support groups, and community-based organizations. Callers can also order free publications and other information.  Call 1-391.491.6356/Website: www.[facility].gov  *[facility] 2-1-1: This is a toll free, confidential, 24-hour-a-day service which connects you to a community  in your area who can help you find services and resources that are available to you locally and provide critical services that can improve and save lives.  Call: 211  /Website: http://www.Violet Greyorg/       Sherine, or Lyly, our Behavioral Health Nurse Navigators, will be calling you after your  "discharge, on the phone number that you provided.  They will be available as an additional support, if needed.   If you wish to speak with one of them, you may contact Sherine at [phone] or Lyly at [phone]       March 24, 2020   [facility] Food Pantries   1. [place] serves households in [facility] and is located in [employer], [address]. Food distribution is the third Thursday of each month from 6 to 7 p.m. For more information, contact Margarette Cueva at [phone].   2.  [place] serves households in the [employer] and is located in [address]. Food distribution is the second Saturday of each month from 9 to 11 a.m.     For more information, contact Courtney Solitario at [phone] or the Rev. Thomas Huggins at [phone].   3.  [place] serves households in [place] and is located in [facility], [address]. Food distribution is the third Monday of each month from 9:30 to 11 a.m.     For more information, contact Ramsey Cifeuntes at [phone].   4.  [facility] serves households in [place]; [facility] and [facility]; [place] and the [facility] [employer], including [employer], and is located in [facility], [address]. Food distribution is every Tuesday from 10 a.m. to 3 p.m.     For more information, contact Sandhya Dalton or Farida Contreras at [phone].   5.  [facility] offers a food pantry through [facility] [employer] [facility] \"Feeding [place].\" The pantry is located at [address]. Food distributions take place the second Wednesday of each month between 4 and 7 p.m. and the fourth Saturday of each month between 9 a.m. and noon. For more information, call [phone].   6.  [facility] serves households in " [facility] and [facility] and is located in [facility], [address]. Food distribution is the third Thursday of each month from 1 to 3:30 p.m.     For more information, contact Sheri Khalil at [phone] or Amy Daniels at [phone].   7.  [place] Area serves households in [facility] and [facility], and the [facility] and is located in [employer], [address]. Food distribution is the fourth Friday of each month from 8:30 a.m. to 3:30 p.m.    For more information, contact Kaylen Avelar at [phone], or Prashant Chau at [phone].   8.  [place] serves households in [place] and is located in the [facility], [address]. Food distribution is the fourth Tuesday of each month from 10 a.m. to noon.     For more information, contact Sherine Cuellar at [phone], or Pete Bradley at [phone].   9.  [place] Area serves households in [facility] and is located in the [facility], [facility]. Food distribution is from January to October, the fourth Saturday of each month from 9 to 11 a.m. and for November and December, the third Saturday of each month from 9 to 11 a.m.     For more information, contact Lita Ghotra at [phone].   10.  [place] Area serves households in [facility] and [facility] and [facility] and is located in [facility], [address] [place] [address]. Food distribution is the second Tuesday of each month from 2 to 4 p.m.     For more information, contact Martin Acevedo at [phone] or the Rev. Damien Epsinal at [phone].You may qualify for Lifeline benefits if your total household income meets the poverty guidelines for your state.  What is Lifeline Phone  Service?  The [place] Lifeline program is a government benefit that provides free cell phone service* in [place] for millions of eligible customers.  You may qualify for the Lifeline program in [place] if you receive assistance from:    Visit: https://SmartVault.Metranome/lifeline/    Am I Eligible For Lifeline Government Phone Service?  Qualifying individuals or families simply need to submit eligibility documents and answer a series of questions. In order to receive a mobile device, your household needs to meet the income and program qualification guidelines. Learn more about how to get free government phone service.  To fully understand the Lifeline program, the following outlines specific participation rules and account procedures for eligible customers. Get complete insight into how to request and gain access to Lifeline government phone service through reputable providers like [employer]. Learn more about how to get free government phone service below.    Qualify by Government Program Use  Medicaid / Medi-Conor  Supplemental Nutrition Assistance Program (SNAP) / CalFresh  Supplemental Security Income (SSI)  Federal Public Housing Assistance or Section 8  Women, Infants, and Children Program (WIC)      [facility] Walk-In [address] [address] [phone]  [facility], located at the [place] campus, offers a welcoming and comfortable, non-residential environment for those dealing with a variety of mental health issues.  Those seeking services or support for a non-life-threatening mental health circumstance will be greeted by a  and will be assessed by a professional crisis intervention specialist in a relaxed, non-clinical environment. Individuals will be evaluated and provided with the resources and/or referrals needed to deal with the immediate situation. This may include psychotherapy sessions or  connections other community resources - such as Veterans Affairs -- and specialists. A  may be assigned to provide ongoing support.  As this is considered the least restrictive environment for mental health services, the walk-in center is not the ideal option for anyone who is experiencing an extreme mental health crisis. Individuals who are experiencing that level of distress are encouraged to seek immediate medical attention at a hospital emergency room.    Medical Services Included in MATP   Transportation is available to almost any service that [place] pays for. Transportation can be provided to: physicians, dentists, health clinics, podiatrists, rural health clinics, hospice programs, physical therapists, outpatient services, pharmacies, drug and alcohol clinics, mental health centers, outpatient rehab services, optometrists, dialysis clinics, psychologists, and ambulatory surgical services.   Services that [facility] does not include are emergency or other transportation requiring an ambulance, transportation to sheltered workshops, day care programs, transportation for visitation purposes, stretcher service, qoqm-hjsijis-zmpt service, transportation to non-medical services, and transportation during severe weather when deemed unsafe or transportation to any medical services that are not payable through the Medical Assistance Program.   Exceptional transportation costs such as air travel, lodging, meals, and attendants are paid for by local county assistance offices instead of [facility].     County Contacts   County Phone Toll Free   [place] [phone]) [phone]   [place] [phone]   [place] [phone]   [place] [phone]   [place] [phone]   [place] [phone]   [place] [phone]   [place] [phone]   [place] [phone]   [place] [phone]   [place] [phone]  [phone]   [place] [phone]   [place] [phone] Same as Local   [place] [phone] Same as Local   [place] [phone]   [place] [phone]   [place] [phone]   [place] [phone]   [place] [phone] (7722) [phone]   [place] [phone]   [place] [phone] (2933) [phone]   [place] [phone]   [place] [phone]   [place] [phone]   [place] [phone] Same as Local   [place] [phone]   [place] [phone]   [place] [phone] (2133) [phone]   [place] [phone]   [place] [phone]   [place] [phone]   [place] [phone]   [place] [phone]   [place] [phone]   [place] [phone] Same as Local   [place] [phone]   [place] [phone]   [place] [phone] Same as Local   [place] [phone]   [place] [phone]   [place] [phone]   [place] [phone]   [place] [phone]   [place] [phone]   [place] [phone]   [place] [phone] Same as Local   [place] [phone] (8488) [phone]   [place] [phone]   [place] [phone] (7493) [phone]   [place] [phone] (7783) [phone]   [place] [phone]   [place] [phone]   [place] [phone]   [place] [phone]   [place] [phone] (2427) [phone]   [place] [phone]   [place] [phone]   [place] [phone]   [place] [phone]   [place] [phone] (7766) [phone]   [place] [phone]     [place] [phone]   [place]  [phone]   [place] [phone]   [place] [phone]   [place] [phone]   [place] [phone]) [phone]

## 2024-05-06 NOTE — SOCIAL WORK
CM placed call to pt spouse [phone] Ashia. Unable to leave voice mail.     CM placed call to pt nephew Cheng [phone]. CM lvm requesting a return call.

## 2024-05-07 PROBLEM — F31.63 BIPOLAR DISORDER, CURRENT EPISODE MIXED, SEVERE (HCC): Status: RESOLVED | Noted: 2024-05-03 | Resolved: 2024-05-07

## 2024-05-07 PROBLEM — E44.0 MODERATE PROTEIN-CALORIE MALNUTRITION (HCC): Status: ACTIVE | Noted: 2024-05-07

## 2024-05-07 PROBLEM — F31.9 BIPOLAR DISORDER (HCC): Status: ACTIVE | Noted: 2024-05-07

## 2024-05-07 PROCEDURE — 99232 SBSQ HOSP IP/OBS MODERATE 35: CPT

## 2024-05-07 RX ORDER — NICOTINE 21 MG/24HR
1 PATCH, TRANSDERMAL 24 HOURS TRANSDERMAL DAILY
Qty: 28 PATCH | Refills: 0 | Status: SHIPPED | OUTPATIENT
Start: 2024-05-08

## 2024-05-07 RX ORDER — CYANOCOBALAMIN 1000 UG/ML
1000 INJECTION, SOLUTION INTRAMUSCULAR; SUBCUTANEOUS
Qty: 1 ML | Refills: 0 | Status: SHIPPED | OUTPATIENT
Start: 2024-06-04

## 2024-05-07 RX ORDER — LIDOCAINE 50 MG/G
1 PATCH TOPICAL DAILY
Qty: 30 PATCH | Refills: 0 | Status: SHIPPED | OUTPATIENT
Start: 2024-05-08

## 2024-05-07 RX ORDER — ATORVASTATIN CALCIUM 10 MG/1
10 TABLET, FILM COATED ORAL
Qty: 30 TABLET | Refills: 0 | Status: SHIPPED | OUTPATIENT
Start: 2024-05-07

## 2024-05-07 RX ORDER — TRAZODONE HYDROCHLORIDE 50 MG/1
50 TABLET ORAL
Qty: 30 TABLET | Refills: 1 | Status: SHIPPED | OUTPATIENT
Start: 2024-05-07

## 2024-05-07 RX ORDER — BENZOCAINE/MENTHOL 6 MG-10 MG
LOZENGE MUCOUS MEMBRANE 4 TIMES DAILY PRN
Status: DISCONTINUED | OUTPATIENT
Start: 2024-05-07 | End: 2024-05-08 | Stop reason: HOSPADM

## 2024-05-07 RX ORDER — ERGOCALCIFEROL 1.25 MG/1
50000 CAPSULE ORAL WEEKLY
Qty: 8 CAPSULE | Refills: 0 | Status: SHIPPED | OUTPATIENT
Start: 2024-05-12 | End: 2024-07-08

## 2024-05-07 RX ORDER — METHOCARBAMOL 500 MG/1
500 TABLET, FILM COATED ORAL EVERY 6 HOURS PRN
Qty: 60 TABLET | Refills: 0 | Status: SHIPPED | OUTPATIENT
Start: 2024-05-07

## 2024-05-07 RX ORDER — BUDESONIDE AND FORMOTEROL FUMARATE DIHYDRATE 80; 4.5 UG/1; UG/1
2 AEROSOL RESPIRATORY (INHALATION) 2 TIMES DAILY
Qty: 10.2 G | Refills: 0 | Status: SHIPPED | OUTPATIENT
Start: 2024-05-07

## 2024-05-07 RX ORDER — BENZOCAINE/MENTHOL 6 MG-10 MG
LOZENGE MUCOUS MEMBRANE 4 TIMES DAILY PRN
Qty: 14 G | Refills: 0 | Status: SHIPPED | OUTPATIENT
Start: 2024-05-07

## 2024-05-07 RX ORDER — OLANZAPINE 15 MG/1
15 TABLET ORAL
Qty: 30 TABLET | Refills: 1 | Status: SHIPPED | OUTPATIENT
Start: 2024-05-07

## 2024-05-07 RX ORDER — DICYCLOMINE HYDROCHLORIDE 10 MG/1
10 CAPSULE ORAL 4 TIMES DAILY PRN
Qty: 90 CAPSULE | Refills: 0 | Status: SHIPPED | OUTPATIENT
Start: 2024-05-07

## 2024-05-07 RX ORDER — GUAIFENESIN 600 MG/1
600 TABLET, EXTENDED RELEASE ORAL EVERY 12 HOURS SCHEDULED
Qty: 20 TABLET | Refills: 0 | Status: SHIPPED | OUTPATIENT
Start: 2024-05-07

## 2024-05-07 RX ORDER — MESALAMINE 400 MG/1
800 CAPSULE, DELAYED RELEASE ORAL 3 TIMES DAILY
Qty: 90 CAPSULE | Refills: 0 | Status: SHIPPED | OUTPATIENT
Start: 2024-05-07

## 2024-05-07 RX ADMIN — ACETAMINOPHEN 975 MG: 325 TABLET ORAL at 23:10

## 2024-05-07 RX ADMIN — ATORVASTATIN CALCIUM 10 MG: 10 TABLET, FILM COATED ORAL at 16:03

## 2024-05-07 RX ADMIN — MESALAMINE 800 MG: 400 CAPSULE, DELAYED RELEASE ORAL at 16:03

## 2024-05-07 RX ADMIN — NICOTINE 1 PATCH: 21 PATCH, EXTENDED RELEASE TRANSDERMAL at 08:22

## 2024-05-07 RX ADMIN — BUDESONIDE AND FORMOTEROL FUMARATE DIHYDRATE 2 PUFF: 80; 4.5 AEROSOL RESPIRATORY (INHALATION) at 19:16

## 2024-05-07 RX ADMIN — MESALAMINE 800 MG: 400 CAPSULE, DELAYED RELEASE ORAL at 21:10

## 2024-05-07 RX ADMIN — LIDOCAINE 1 PATCH: 50 PATCH CUTANEOUS at 08:21

## 2024-05-07 RX ADMIN — GUAIFENESIN 600 MG: 600 TABLET ORAL at 08:21

## 2024-05-07 RX ADMIN — OLANZAPINE 15 MG: 15 TABLET, FILM COATED ORAL at 21:10

## 2024-05-07 RX ADMIN — MESALAMINE 800 MG: 400 CAPSULE, DELAYED RELEASE ORAL at 08:21

## 2024-05-07 RX ADMIN — GUAIFENESIN 600 MG: 600 TABLET ORAL at 21:10

## 2024-05-07 RX ADMIN — TRAZODONE HYDROCHLORIDE 50 MG: 50 TABLET ORAL at 21:10

## 2024-05-07 RX ADMIN — BUDESONIDE AND FORMOTEROL FUMARATE DIHYDRATE 2 PUFF: 80; 4.5 AEROSOL RESPIRATORY (INHALATION) at 08:20

## 2024-05-07 NOTE — SOCIAL WORK
05/07/24   Team Meeting   Meeting Type Daily Rounds   Team Members Present   Team Members Present Physician;Nurse;;   Physician Team Member HARMAN Arias MD, MD    Nursing Team Member Edna Sharma RN   Social Work Team Member TANNER Bond   OT Team Member                        Coretta FRANZS   Patient/Family Present   Patient Present No   Mild dep, mod anx, complaint, d/c home to [facility]

## 2024-05-07 NOTE — PROGRESS NOTES
"Progress Note - Behavioral Health   Ace Roy 53 y.o. male MRN: [id number]  Unit/Bed#: OAB[facility] 200-02 Encounter: [id number]    Assessment/Plan   Principal Problem:    Bipolar disorder, current episode mixed, severe (HCC)      Behavior over the last 24 hours:  unchanged  Sleep: broken  Appetite: normal  Medication side effects: No  ROS:  complaints of left hip pain  and all other systems are negative    Subjective: Ace was seen today for psychiatric follow-up.  Patient remains visible on the unit, social with peers.  He continues to exhibit a controlled mood on the unit with no recent aggression agitation toward staff or peers he is disheveled with marginal hygiene.  Per patient he is \"feeling better \".  According to nursing staff report patient is compliant with his current medication regimen.  He denied any SI/HI/AVH.  He did not appear internally preoccupied.    Mental Status Evaluation:  Appearance:  age appropriate, bearded, and disheveled   Behavior:  cooperative   Speech:  normal pitch and normal volume   Mood:  improving   Affect:  mood-congruent   Thought Process:  circumstantial   Associations: circumstantial associations   Thought Content:  No overt delusions   Perceptual Disturbances: Denied AVH, did not appear internally preoccupied   Risk Potential: Suicidal Ideations none at present  Homicidal Ideations none at present  Potential for Aggression No   Sensorium:  person, place, and time/date   Memory:  recent and remote memory grossly intact   Consciousness:  alert and awake    Attention: attention span and concentration were age appropriate   Insight:  fair   Judgment: fair   Gait/Station: normal gait/station   Motor Activity: no abnormal movements     Progress Toward Goals: Unchanged.  Patient is more engaged in conversation with linear thought process.  He is compliant with his current psychotropic medication regimen.  He denies side effects with current psychotropic medication regimen.  Will " continue current psychotropic medication regimen.  No discharge date at this time.    Recommended Treatment: Continue with group therapy, milieu therapy and occupational therapy.      Risks, benefits and possible side effects of Medications:   Risks, benefits, and possible side effects of medications explained to patient and patient verbalizes understanding.      Medications: all current active meds have been reviewed.    Labs: I have personally reviewed all pertinent laboratory/tests results. Most Recent Labs:   Lab Results   Component Value Date    WBC 7.74 05/02/2024    RBC 4.68 05/02/2024    HGB 15.1 05/02/2024    HCT 43.4 05/02/2024     05/02/2024    RDW 12.5 05/02/2024    NEUTROABS 4.32 05/02/2024    SODIUM 135 05/02/2024    K 3.8 05/02/2024     05/02/2024    CO2 23 05/02/2024    BUN 13 05/02/2024    CREATININE 0.87 05/02/2024    GLUC 100 05/02/2024    CALCIUM 9.3 05/02/2024    AST 25 05/02/2024    ALT 12 05/02/2024    ALKPHOS 51 05/02/2024    TP 7.4 05/02/2024    ALB 4.6 05/02/2024    TBILI 0.58 05/02/2024    CHOLESTEROL 245 (H) 05/03/2024    HDL 42 05/03/2024    TRIG 119 05/03/2024    LDLCALC 179 (H) 05/03/2024    [id number] 2.660 05/02/2024       Counseling / Coordination of Care  Total floor / unit time spent today 25 minutes.

## 2024-05-07 NOTE — PROGRESS NOTES
05/07/24 1254   Discharge Planning   Living Arrangements Lives Alone   Support Systems Self;Psychiatrist;Family members;Friends/neighbors   Assistance Needed psych outpatient services   Type of Current Residence Private residence   Current Home Care Services No   Other Referral/Resources/Interventions Provided:   Referrals Provided: Psychiatrist   Discharge Communications   Discharge planning discussed with: patient, tx team, psych, pcp   Transportation at Discharge? Yes   Transport at Discharge  Auto with designated    Contacts   Patient Contacts Cheng Roy (nephew)   Relationship to Patient: Family   Contact Method Phone   Phone Number [phone]   Reason/Outcome Continuity of Care;Discharge Planning   [facility]   Would you like to participate in our [facility] service program?   No - Declined

## 2024-05-07 NOTE — NURSING NOTE
Pt was visible on the unit and social with peers. Compliant with medication. Cooperative with staff during care. Endorsed mild to moderate anxiety and depression. Affect appropriate to circumstance. Eye contact good. Speech pattern normal and relaxed. Appearance and hygiene was disheveled. Made c/o left hip pain, no PRN pain medication was administered. Pt is resting in bed. 7 minute safety checks continued.

## 2024-05-07 NOTE — PROGRESS NOTES
"Progress Note - Ace Roy 53 y.o. male MRN: [id number]    Unit/Bed#: -02 Encounter: [id number]        Subjective:   Patient seen and examined at bedside after reviewing the chart and discussing the case with the caring staff.      Patient examined at bedside.  Patient is reporting that he has history of chronic itchiness over the left ear especially in the tragus area and the auricles.    Patient is being discharged tomorrow 5/8/2024.  Patient is requesting his prescriptions.  I reviewed and reconciled patient's problem list and medications.    Physical Exam   Vitals: Blood pressure 109/57, pulse 67, temperature 98.3 °F (36.8 °C), temperature source Temporal, resp. rate 18, height 5' 9\" (1.753 m), weight 58.5 kg (129 lb 0.6 oz), SpO2 95%.,Body mass index is 19.06 kg/m².  Constitutional: Patient in no acute distress.  HEENT: PERR, EOMI, MMM.  Cardiovascular: Normal rate and regular rhythm.    Pulmonary/Chest: Effort normal and breath sounds normal.   Abdomen: Soft, + BS, NT.    Assessment/Plan:  Ace Roy is a(n) 53 y.o. male with MDD.    Medical clearance.  Patient is medically cleared for discharge.  All scripts will be sent out for the patient.  Tobacco abuse.  NRT.  Ulcerative colitis.  Patient is on mesalamine 800 mg 3 times daily (Lialda 2.4 mg daily nonformulary).  DJD/OA/left hip pain.  Tylenol as needed.  Lidocaine patch to left hip and Robaxin as needed.  Low BMI.  Dietician consulted.  Emphysema/cough.  Started Symbicort 2 puff twice daily and Mucinex twice daily 5/3.  Vitamin D deficiency.  Patient started on vitamin D2 50,000 units weekly for 10 weeks followed by vitamin D3 1000 units daily.  Vitamin B12 deficiency.  Patient started on monthly vitamin B12 injections.  Hyperlipidemia.   mg/dL on 5/2/24.  ASCVD 10.4%.  Start atorvastatin 10 mg daily 5/4.  Pruritus involving the left ear tragus/external ear.  I will put the patient on hydrocortisone cream 4 times daily as needed.  "

## 2024-05-07 NOTE — SOCIAL WORK
CM placed call to the following in effort to discuss d/c dispo...    Bela Roy (spouse) [phone]-lvm    Cheng Johnson [phone]-lv

## 2024-05-07 NOTE — PLAN OF CARE
Problem: Risk for Self Injury/Neglect  Goal: Treatment Goal: Remain safe during length of stay, learn and adopt new coping skills, and be free of self-injurious ideation, impulses and acts at the time of discharge  Outcome: Progressing  Goal: Refrain from harming self  Description: Interventions:  - Monitor patient closely, per order  - Develop a trusting relationship  - Supervise medication ingestion, monitor effects and side effects   Outcome: Progressing  Goal: Recognize maladaptive responses and adopt new coping mechanisms  Outcome: Progressing  Goal: Verbalize thoughts and feelings  Description: Interventions:  - Assess and re-assess patient's lethality and potential for self-injury  - Engage patient in 1:1 interactions, daily, for a minimum of 15 minutes  - Encourage patient to express feelings, fears, frustrations, hopes  - Establish rapport/trust with patient   Outcome: Progressing  Goal: Attend and participate in unit activities, including therapeutic, recreational, and educational groups  Description: Interventions:  - Provide therapeutic and educational activities daily, encourage attendance and participation, and document same in the medical record  - Obtain collateral information, encourage visitation and family involvement in care   Outcome: Progressing  Goal: Complete daily ADLs, including personal hygiene independently, as able  Description: Interventions:  - Observe, teach, and assist patient with ADLS  - Monitor and promote a balance of rest/activity, with adequate nutrition and elimination  Outcome: Progressing     Problem: Depression  Goal: Treatment Goal: Demonstrate behavioral control of depressive symptoms, verbalize feelings of improved mood/affect, and adopt new coping skills prior to discharge  Outcome: Progressing  Goal: Refrain from isolation  Description: Interventions:  - Develop a trusting relationship   - Encourage socialization   Outcome: Progressing  Goal: Refrain from  self-neglect  Outcome: Progressing     Problem: Anxiety  Goal: Anxiety is at manageable level  Description: Interventions:  - Assess and monitor patient's anxiety level.   - Monitor for signs and symptoms (heart palpitations, chest pain, shortness of breath, headaches, nausea, feeling jumpy, restlessness, irritable, apprehensive).   - Collaborate with interdisciplinary team and initiate plan and interventions as ordered.  - Thurman patient to unit/surroundings  - Explain treatment plan  - Encourage participation in care  - Encourage verbalization of concerns/fears  - Identify coping mechanisms  - Assist in developing anxiety-reducing skills  - Administer/offer alternative therapies  - Limit or eliminate stimulants  Outcome: Progressing

## 2024-05-07 NOTE — SOCIAL WORK
[employer] message received. Pt scheduled to see Dr Yepez at the [place] location on 5/16/2024 @ 3:00pm in-person.

## 2024-05-07 NOTE — DISCHARGE SUMMARY
"Discharge Summary - Behavioral Health   Ace Roy 53 y.o. male MRN: [id number]  Unit/Bed#: [facility] 200-02 Encounter: [id number]     Admission Date: 5/2/2024         Discharge Date: 5/8/2024    Attending Psychiatrist: Omar Garcia MD    Reason for Admission/HPI: Suicidal ideation [R45.851]    According to H&P of Dr. Domínguez    Patient is a 53 y.o. male presented with presents with Signs of suicidal potential and Severe agitation.  Patient was admitted to psychiatric unit on a involuntarily 302 commitment basis, later signed a 201.     Primary complaints include: agitation, depression worse, difficulty sleeping, and feeling suicidal.  Onset of symptoms was abrupt starting 7 days ago with rapidly worsening course since that time. Psychosocial Stressors: family, marital, and social.     Per ED Note by Dr. Guzmán on 5/2/24:  53 y.o. male presenting with suicide attempt.  Patient was brought in by police under 302, apparently he called the ICU where his wife is currently admitted and told them to give her a message that he was going to kill himself.  Staff at that ICU called police who brought the patient in.  Patient was found with a knife cutting at his wrist, brought to the ER.  Patient states that if the knife was sharper he would be dead he also states that if his son had not taken away his guns he would be dead and that he would like to shoot himself.  Patient is requesting that he be discharged so that he can kill himself.  He states that he can no longer see his wife of 40 years, states that he is losing his house and does not have any money, states he has nothing left to live for him and the only thing to do is to kill himself and wipe the slate clean \".  He denies fever, trauma, numbness, weakness, other symptoms.  Patient states he has no idea when his last tetanus shot was, states he smokes 2 packs a day.      Per Crisis worker Christen Branch on 5/2/24  Pt presents to the ED from home accompanied by police who " "petitioned 302 alleging, \"I was called by an ICU nurse that Ace Roy wanted to kill himself because he couldn't learn about his wife's condition. When I arrived he told me he wanted to kill himself and showed me his wrist that he had cut.\" CW read and reviewed 302 w/pt. CW read pt 302 rights. Pt confirms having attempted to commit suicide. Pt confirms having SA in the past via shooting self. Pt does not answer if pt has HI's. Pt appears irritable and does not engage in extensive conversation. Pt provides little details and insight. Pt confirms having SI's. Pt reports sleep and appetite are not so good. Pt does not report having a formal MH dx. Pt confirms having called for help. Pt resides alone at this time and confirms \"wife is in the ICU and almost .\" Pt denies AVH's. Pt reports smoking aprox 2 packs of cigarettes daily, denies use of illegal substances and endorses using occiasional ETOH. Pt denies any legal issues and states, \"stomach problems\" for medical conditions. Pt is calm, irritable, maintains poor eye contact. Pt keeps eyes closed during assessment. Pt states, \"I will only go local and if I can't stay close than I'm going home.\" CW explained to pt, CW could not guarantee placement at a hospital which is local, specifically [facility], as pt was requesting. Pt stated, \"Then I'm not going to any hospital.\" CW attempted to explain to pt the seriousness of the events which took place and the recommendation of the ED doctor for IP tx. Pt states, \"I don't care I'm not going.\" CW advised pt, ED doctor will uphold the 302. Pt refused to engage in further conversation and remained silent.      Dr. Guzmán upheld the 302.     CW sent clinicals to [facility]     On interview, patient states he was upset that he has not been able to see his wife in the hospital as he was banned by the hospital and by his children. Patient states that he rushed his wife to the hospital last week due to sudden and persistent " "fatigue. States that it got to the point where his wife had to be placed on a ventilator. Patient states that he was initially able to be with his wife, however, his children came in and then, suddenly, he was not allowed to see his wife.      Patient states that during this time, there was a lot of family conflict. States he's been trying to see his wife because he loves her. States that the hospital would not speak with him or give him information. Endorses knowing that his wife got better but states he does not know why they will not allow him to see her. States that he was upset and started drinking. States he told the hospital, \"If I'm banned, I might as well end it\" after he could not speak with his wife. States he hasn't drank for 20 years. States he started cutting himself and then the police came and brought him to the ED.     Patient endorses history of impulsive activity when family members pass away or are at risk of passing away. Endorses attempted suicide by gun when his father passed away. States he was very close to his father. States that the gun didn't work. Denies going to inpatient psychiatry during that time.      Patient endorsed history in the past of having multiple days without sleep. States it can go on for days, weeks at a time. States if he falls asleep, it'll be for 30 minutes and then back up. States that sometimes, during this time, he may be mumbling to himself. Per staff, family collateral reports patient with rapid speech, not acting like himself during this episode. Patient denies ever seeing mental health providers in the past.    Hospital Course: The patient was admitted to the inpatient psychiatric unit and started on every 7 minutes precautions. During the hospitalization the patient was attending individual therapy, group therapy, milieu therapy and occupational therapy.    Psychiatric medications were titrated over the hospital stay. To address psychotic symptoms and insomnia " the patient was started on antipsychotic medication Zyprexa and hypnotic medication Trazodone. Medication doses were titrated during the hospital course. Prior to beginning of treatment medications risks and benefits and possible side effects including risk of parkinsonian symptoms, Tardive Dyskinesia and metabolic syndrome related to treatment with antipsychotic medications, risk of cardiovascular events in elderly related to treatment with antipsychotic medications, and risk of impaired next-day mental alertness, complex sleep-related behavior and dependence related to treatment with hypnotic medications were reviewed with the patient. The patient verbalized understanding and agreement for treatment.     Patient's symptoms improved gradually over the hospital course. At the end of treatment the patient was doing well. Mood was stable at the time of discharge. The patient denied suicidal ideation, intent or plan at the time of discharge and denied homicidal ideation, intent or plan at the time of discharge. There was no overt psychosis at the time of discharge. Sleep and appetite were improved. The patient was tolerating medications and was not reporting any significant side effects at the time of discharge.    Since the patient was doing well at the end of the hospitalization, treatment team felt that the patient could be safely discharged to outpatient care.     The outpatient follow up with  [facility] psych and PCP  was arranged by the unit  upon discharge.    Mental Status at time of Discharge:     Appearance:  age appropriate and bearded   Behavior:  cooperative   Speech:  normal pitch and normal volume   Mood:  euthymic   Affect:  mood-congruent   Thought Process:  goal directed   Thought Content:  No overt delusions   Perceptual Disturbances: Denied AVH, did not appear internally preoccupied   Risk Potential: none   Sensorium:  person, place, and time/date   Cognition:  recent and remote memory grossly  intact   Consciousness:  alert and awake    Attention: attention span and concentration were age appropriate   Insight:  good   Judgment: good   Gait/Station: normal gait/station   Motor Activity: no abnormal movements     Admission Diagnosis:Suicidal ideation [R45.851]    Discharge Diagnosis:   Principal Problem:    Bipolar disorder ([facility])  Active Problems:    Moderate protein-calorie malnutrition ([facility])  Resolved Problems:    Bipolar disorder, current episode mixed, severe ([facility])        Lab results:  Admission on 05/02/2024   Component Date Value    Folate 05/03/2024 8.7     Vitamin B-12 05/03/2024 346     Vit D, 25-Hydroxy 05/03/2024 14.5 (L)     Cholesterol 05/03/2024 245 (H)     Triglycerides 05/03/2024 119     HDL, Direct 05/03/2024 42     LDL Calculated 05/03/2024 179 (H)        Discharge Medications:  Current Discharge Medication List        START taking these medications    Details   atorvastatin (LIPITOR) 10 mg tablet Take 1 tablet (10 mg total) by mouth daily with dinner  Qty: 30 tablet, Refills: 0    Associated Diagnoses: Dyslipidemia      budesonide-formoterol (SYMBICORT) 80-4.5 MCG/ACT inhaler Inhale 2 puffs 2 (two) times a day Rinse mouth after use.  Qty: 10.2 g, Refills: 0    Associated Diagnoses: COPD (chronic obstructive pulmonary disease) ([facility])      Cholecalciferol (VITAMIN D3) 1,000 units tablet Take 1 tablet (1,000 Units total) by mouth daily Do not start before July 8, 2024.  Qty: 30 tablet, Refills: 0    Associated Diagnoses: Vitamin D deficiency      cyanocobalamin 1,000 mcg/mL Inject 1 mL (1,000 mcg total) into a muscle every 30 (thirty) days  Qty: 1 mL, Refills: 0    Associated Diagnoses: Vitamin B12 deficiency      dicyclomine (BENTYL) 10 mg capsule Take 1 capsule (10 mg total) by mouth 4 (four) times a day as needed (Abdominal cramps)  Qty: 90 capsule, Refills: 0    Associated Diagnoses: Abdominal cramps      ergocalciferol (VITAMIN D2) 50,000 units Take 1 capsule (50,000 Units total) by  mouth once a week for 9 doses  Qty: 8 capsule, Refills: 0    Associated Diagnoses: Vitamin D deficiency      guaiFENesin (MUCINEX) 600 mg 12 hr tablet Take 1 tablet (600 mg total) by mouth every 12 (twelve) hours  Qty: 20 tablet, Refills: 0    Associated Diagnoses: URI with cough and congestion      hydrocortisone 1 % cream Apply topically 4 (four) times a day as needed for irritation  Qty: 14 g, Refills: 0    Associated Diagnoses: Pruritus      lidocaine (LIDODERM) 5 % Apply 1 patch topically over 12 hours daily Remove & Discard patch within 12 hours or as directed by MD  Qty: 30 patch, Refills: 0    Associated Diagnoses: Lumbago      mesalamine (DELZICOL) 400 mg Take 2 capsules (800 mg total) by mouth 3 (three) times a day  Qty: 90 capsule, Refills: 0    Associated Diagnoses: Ulcerative colitis (HCC)      methocarbamol (ROBAXIN) 500 mg tablet Take 1 tablet (500 mg total) by mouth every 6 (six) hours as needed for muscle spasms  Qty: 60 tablet, Refills: 0    Associated Diagnoses: Lumbago      nicotine (NICODERM CQ) 21 mg/24 hr TD 24 hr patch Place 1 patch on the skin over 24 hours daily  Qty: 28 patch, Refills: 0    Associated Diagnoses: Tobacco abuse      OLANZapine (ZyPREXA) 15 mg tablet Take 1 tablet (15 mg total) by mouth daily at bedtime  Qty: 30 tablet, Refills: 1    Associated Diagnoses: Bipolar disorder, current episode mixed, severe, unspecified whether psychotic features ([facility])      traZODone (DESYREL) 50 mg tablet Take 1 tablet (50 mg total) by mouth daily at bedtime  Qty: 30 tablet, Refills: 1    Associated Diagnoses: Primary insomnia              Current Discharge Medication List        STOP taking these medications       mesalamine (LIALDA) 1.2 g EC tablet Comments:   Reason for Stopping:         naproxen (NAPROSYN) 500 mg tablet Comments:   Reason for Stopping:         oxyCODONE (OxyCONTIN) 10 mg 12 hr tablet Comments:   Reason for Stopping:                Current Discharge Medication List            Current Discharge Medication List           Discharge instructions/Information to patient and family:   See after visit summary for information provided to patient and family.      Provisions for Follow-Up Care:  See after visit summary for information related to follow-up care and any pertinent home health orders.      Discharge Statement     I spent 30 minutes discharging the patient. This time was spent on the day of discharge. I had direct contact with the patient on the day of discharge.     Additional documentation is required if more than 30 minutes were spent on discharge:    I reviewed with Ace importance of compliance with medications and outpatient treatment after discharge.  I discussed the medication regimen and possible side effects of the medications with Ace prior to discharge. At the time of discharge he was tolerating psychiatric medications.  I discussed outpatient follow up with Ace.  I reviewed with Ace crisis plan and safety plan upon discharge.

## 2024-05-07 NOTE — SOCIAL WORK
CARLOS received return call from pt nephgilles Anand [phone]. Cheng confirmed that pt can return to his camper. Cheng stated that he will assist pt with aftercare and picking up meds. CM reviewed d/c plan and supports. Cheng in agreement and expressed no issues/concerns.  CM will advise Cheng of d/c date/time.     UPDATE: 12:50pm CM confirmed pt p/u at 12pm tomorrow for d/c with Cheng.

## 2024-05-07 NOTE — PROGRESS NOTES
05/07/24 1440   Activity/Group Checklist   Group Admission/Discharge  (Relapse Prevention Plan)   Attendance Attended   Attendance Duration (min) 0-15   Interactions Interacted appropriately   Affect/Mood Appropriate   Goals Achieved Identified feelings;Identified triggers;Identified relapse prevention strategies;Discussed coping strategies;Discussed discharge plans;Identified resources and support systems;Able to listen to others;Able to engage in interactions;Able to reflect/comment on own behavior;Able to manage/cope with feelings;Able to self-disclose;Able to recieve feedback;Able to experience relief/decrease in symptoms     Patient agreeable to meet and complete relapse prevention plan with [facility].  Patient information from forms can be found in media.

## 2024-05-08 VITALS
RESPIRATION RATE: 16 BRPM | DIASTOLIC BLOOD PRESSURE: 59 MMHG | HEIGHT: 69 IN | TEMPERATURE: 97.6 F | WEIGHT: 129.04 LBS | SYSTOLIC BLOOD PRESSURE: 117 MMHG | BODY MASS INDEX: 19.11 KG/M2 | HEART RATE: 53 BPM | OXYGEN SATURATION: 96 %

## 2024-05-08 LAB
ALBUMIN SERPL BCP-MCNC: 4.6 G/DL (ref 3.5–5)
ALP SERPL-CCNC: 51 U/L (ref 34–104)
ALT SERPL W P-5'-P-CCNC: 12 U/L (ref 7–52)
ANION GAP SERPL CALCULATED.3IONS-SCNC: 9 MMOL/L (ref 4–13)
AST SERPL W P-5'-P-CCNC: 25 U/L (ref 13–39)
BILIRUB SERPL-MCNC: 0.58 MG/DL (ref 0.2–1)
BUN SERPL-MCNC: 13 MG/DL (ref 5–25)
CALCIUM SERPL-MCNC: 9.3 MG/DL (ref 8.4–10.2)
CHLORIDE SERPL-SCNC: 103 MMOL/L (ref 96–108)
CO2 SERPL-SCNC: 23 MMOL/L (ref 21–32)
CREAT SERPL-MCNC: 0.87 MG/DL (ref 0.6–1.3)
GFR SERPL CREATININE-BSD FRML MDRD: 97 ML/MIN/1.73SQ M
GLUCOSE SERPL-MCNC: 100 MG/DL (ref 65–140)
POTASSIUM SERPL-SCNC: 3.8 MMOL/L (ref 3.5–5.3)
PROT SERPL-MCNC: 7.4 G/DL (ref 6.4–8.4)
SODIUM SERPL-SCNC: 135 MMOL/L (ref 135–147)

## 2024-05-08 PROCEDURE — 99238 HOSP IP/OBS DSCHRG MGMT 30/<: CPT

## 2024-05-08 RX ADMIN — HYDROCORTISONE: 1 CREAM TOPICAL at 09:03

## 2024-05-08 RX ADMIN — BUDESONIDE AND FORMOTEROL FUMARATE DIHYDRATE 2 PUFF: 80; 4.5 AEROSOL RESPIRATORY (INHALATION) at 09:01

## 2024-05-08 RX ADMIN — LIDOCAINE 1 PATCH: 50 PATCH CUTANEOUS at 09:02

## 2024-05-08 RX ADMIN — GUAIFENESIN 600 MG: 600 TABLET ORAL at 08:59

## 2024-05-08 RX ADMIN — MESALAMINE 800 MG: 400 CAPSULE, DELAYED RELEASE ORAL at 08:59

## 2024-05-08 RX ADMIN — NICOTINE 1 PATCH: 21 PATCH, EXTENDED RELEASE TRANSDERMAL at 09:00

## 2024-05-08 NOTE — PROGRESS NOTES
"Progress Note - Ace Roy 53 y.o. male MRN: [id number]    Unit/Bed#: [facility] 200-02 Encounter: [id number]        Subjective:   Patient seen and examined at bedside after reviewing the chart and discussing the case with the caring staff.      Patient examined at bedside.  Patient has no acute complaints.    Patient is being discharged today, Wednesday 5/8/2024.      Physical Exam   Vitals: Blood pressure 117/59, pulse (!) 53, temperature 97.6 °F (36.4 °C), temperature source Temporal, resp. rate 16, height 5' 9\" (1.753 m), weight 58.5 kg (129 lb 0.6 oz), SpO2 96%.,Body mass index is 19.06 kg/m².  Constitutional: Patient in no acute distress.  HEENT: PERR, EOMI, MMM.  Cardiovascular: Normal rate and regular rhythm.    Pulmonary/Chest: Effort normal and breath sounds normal.   Abdomen: Soft, + BS, NT.    Assessment/Plan:  Ace Roy is a(n) 53 y.o. male with MDD.    Medical clearance.  Patient is medically cleared for discharge.  All scripts will be sent out for the patient.    Tobacco abuse.  NRT.  Ulcerative colitis.  Patient is on mesalamine 800 mg 3 times daily (Lialda 2.4 mg daily nonformulary).  DJD/OA/left hip pain.  Tylenol as needed.  Lidocaine patch to left hip and Robaxin as needed.  Low BMI.  Dietician consulted.  Emphysema/cough.  Started Symbicort 2 puff twice daily and Mucinex twice daily 5/3.  Vitamin D deficiency.  Patient started on vitamin D2 50,000 units weekly for 10 weeks followed by vitamin D3 1000 units daily.  Vitamin B12 deficiency.  Patient started on monthly vitamin B12 injections.  Hyperlipidemia.   mg/dL on 5/2/24.  ASCVD 10.4%.  Start atorvastatin 10 mg daily 5/4.  Pruritus involving left tragus/external ear.  Apply hydrocortisone cream 4 times daily as needed.    The patient was discussed with Dr. Garcia and he is in agreement with the above note.  "

## 2024-05-08 NOTE — SOCIAL WORK
05/08/24   Team Meeting   Meeting Type Daily Rounds   Team Members Present   Team Members Present Physician;Nurse;;   Physician Team Member HARMAN Arias MD, MD    Nursing Team Member Edna Sharma RN   Social Work Team Member TANNER Bond   OT Team Member                        Coretta MARTINEZ   Patient/Family Present   Patient Present No   D/c home today with psych f/u at [facility],

## 2024-05-08 NOTE — PLAN OF CARE
Problem: Risk for Self Injury/Neglect  Goal: Treatment Goal: Remain safe during length of stay, learn and adopt new coping skills, and be free of self-injurious ideation, impulses and acts at the time of discharge  Outcome: Progressing  Goal: Refrain from harming self  Description: Interventions:  - Monitor patient closely, per order  - Develop a trusting relationship  - Supervise medication ingestion, monitor effects and side effects   Outcome: Progressing  Goal: Recognize maladaptive responses and adopt new coping mechanisms  Outcome: Progressing  Goal: Verbalize thoughts and feelings  Description: Interventions:  - Assess and re-assess patient's lethality and potential for self-injury  - Engage patient in 1:1 interactions, daily, for a minimum of 15 minutes  - Encourage patient to express feelings, fears, frustrations, hopes  - Establish rapport/trust with patient   Outcome: Progressing  Goal: Attend and participate in unit activities, including therapeutic, recreational, and educational groups  Description: Interventions:  - Provide therapeutic and educational activities daily, encourage attendance and participation, and document same in the medical record  - Obtain collateral information, encourage visitation and family involvement in care   Outcome: Progressing  Goal: Complete daily ADLs, including personal hygiene independently, as able  Description: Interventions:  - Observe, teach, and assist patient with ADLS  - Monitor and promote a balance of rest/activity, with adequate nutrition and elimination  Outcome: Progressing     Problem: Depression  Goal: Treatment Goal: Demonstrate behavioral control of depressive symptoms, verbalize feelings of improved mood/affect, and adopt new coping skills prior to discharge  Outcome: Progressing  Goal: Refrain from isolation  Description: Interventions:  - Develop a trusting relationship   - Encourage socialization   Outcome: Progressing  Goal: Refrain from  self-neglect  Outcome: Progressing     Problem: Anxiety  Goal: Anxiety is at manageable level  Description: Interventions:  - Assess and monitor patient's anxiety level.   - Monitor for signs and symptoms (heart palpitations, chest pain, shortness of breath, headaches, nausea, feeling jumpy, restlessness, irritable, apprehensive).   - Collaborate with interdisciplinary team and initiate plan and interventions as ordered.  - Kimberly patient to unit/surroundings  - Explain treatment plan  - Encourage participation in care  - Encourage verbalization of concerns/fears  - Identify coping mechanisms  - Assist in developing anxiety-reducing skills  - Administer/offer alternative therapies  - Limit or eliminate stimulants  Outcome: Progressing     Problem: Ineffective Coping  Goal: Participates in unit activities  Description: Interventions:  - Provide therapeutic environment   - Provide required programming   - Redirect inappropriate behaviors   Outcome: Progressing  Goal: Cooperates with admission process  Description: Interventions:   - Complete admission process  Outcome: Completed  Goal: Identifies ineffective coping skills  Outcome: Progressing  Goal: Identifies healthy coping skills  Outcome: Progressing  Goal: Patient/Family participate in treatment and DC plans  Description: Interventions:  - Provide therapeutic environment  Outcome: Progressing  Goal: Patient/Family verbalizes awareness of resources  Outcome: Progressing  Goal: Free from restraint events  Description: - Utilize least restrictive measures   - Provide behavioral interventions   - Redirect inappropriate behaviors   Outcome: Progressing     Problem: Nutrition/Hydration-ADULT  Goal: Nutrient/Hydration intake appropriate for improving, restoring or maintaining nutritional needs  Description: Monitor and assess patient's nutrition/hydration status for malnutrition. Collaborate with interdisciplinary team and initiate plan and interventions as ordered.   Monitor patient's weight and dietary intake as ordered or per policy. Utilize nutrition screening tool and intervene as necessary. Determine patient's food preferences and provide high-protein, high-caloric foods as appropriate.     INTERVENTIONS:  - Monitor oral intake, urinary output, labs, and treatment plans  - Assess nutrition and hydration status and recommend course of action  - Evaluate amount of meals eaten  - Assist patient with eating if necessary   - Allow adequate time for meals  - Recommend/ encourage appropriate diets, oral nutritional supplements, and vitamin/mineral supplements  - Order, calculate, and assess calorie counts as needed  - Recommend, monitor, and adjust tube feedings and TPN/PPN based on assessed needs  - Assess need for intravenous fluids  - Provide specific nutrition/hydration education as appropriate  - Include patient/family/caregiver in decisions related to nutrition  Outcome: Progressing

## 2024-05-08 NOTE — NURSING NOTE
Patient was observed to be visible in the community this evening.  Calm and pleasant during staff interactions. He denies feeling anxious and/ or depressed.  Denies SI, HI and hallucinations. Ace was medication compliant at .  He requested and received PRN Tylenol 975 mg for c/o 10/10 left hip pain at 2310.  Continuous safety rounding in progress.

## 2024-05-08 NOTE — NURSING NOTE
On reassessment of PRN Tylenol effectiveness, patient observed to be sleeping.  No further c/o pain voiced by patient.  PRN pain medication seemingly effective.